# Patient Record
Sex: MALE | Race: WHITE | NOT HISPANIC OR LATINO | Employment: FULL TIME | URBAN - METROPOLITAN AREA
[De-identification: names, ages, dates, MRNs, and addresses within clinical notes are randomized per-mention and may not be internally consistent; named-entity substitution may affect disease eponyms.]

---

## 2018-01-10 NOTE — MISCELLANEOUS
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

## 2018-02-12 ENCOUNTER — OFFICE VISIT (OUTPATIENT)
Dept: FAMILY MEDICINE CLINIC | Facility: CLINIC | Age: 23
End: 2018-02-12
Payer: COMMERCIAL

## 2018-02-12 VITALS
WEIGHT: 145 LBS | BODY MASS INDEX: 20.3 KG/M2 | HEART RATE: 100 BPM | SYSTOLIC BLOOD PRESSURE: 120 MMHG | HEIGHT: 71 IN | TEMPERATURE: 103.2 F | DIASTOLIC BLOOD PRESSURE: 62 MMHG

## 2018-02-12 DIAGNOSIS — J01.90 ACUTE NON-RECURRENT SINUSITIS, UNSPECIFIED LOCATION: Primary | ICD-10-CM

## 2018-02-12 PROCEDURE — 99213 OFFICE O/P EST LOW 20 MIN: CPT | Performed by: FAMILY MEDICINE

## 2018-02-12 PROCEDURE — 3008F BODY MASS INDEX DOCD: CPT | Performed by: FAMILY MEDICINE

## 2018-02-12 RX ORDER — AZITHROMYCIN 500 MG/1
500 TABLET, FILM COATED ORAL DAILY
Qty: 3 TABLET | Refills: 0 | Status: SHIPPED | OUTPATIENT
Start: 2018-02-12 | End: 2018-02-15

## 2018-02-12 NOTE — ASSESSMENT & PLAN NOTE
Patient was placed on Zithromax 500 mg 1 daily #3 , Mucinex D 1 twice a day and Delsym 2 tsp twice a day for his cough  He will get a note to stay out of work for the next 2 days

## 2018-02-12 NOTE — PROGRESS NOTES
Assessment/Plan:    Acute non-recurrent sinusitis  Patient was placed on Zithromax 500 mg 1 daily #3 , Mucinex D 1 twice a day and Delsym 2 tsp twice a day for his cough  He will get a note to stay out of work for the next 2 days  Diagnoses and all orders for this visit:    Acute non-recurrent sinusitis, unspecified location  -     azithromycin (ZITHROMAX) 500 MG tablet; Take 1 tablet (500 mg total) by mouth daily for 3 days          Subjective:      Patient ID: Eduardo Abreu is a 25 y o  male  CC:  Congestion, fever, body aches  Started 2 days ago  Dry cough  HA    Sinus pressure  - ls    HPI:  This is a 67-year-old male who comes in with head congestion, fever for the past 2 days  Also has a headache because of the congestion  He feels achy and has a dry cough  The cough does not keep him awake at night  He does have sinus pressure and felt like several days ago that he might have had the flu  Those symptoms seem to have gone except for his temperature of a 103 2° and last night was 101  Has been using NyQuil and some ibuprofen with no affect  His blood pressure is 120/62  The following portions of the patient's history were reviewed and updated as appropriate: allergies, current medications, past family history, past medical history, past social history, past surgical history and problem list     Review of Systems   Constitutional: Positive for fatigue and fever  Negative for appetite change, chills and diaphoresis  HENT: Positive for congestion, ear pain, postnasal drip, rhinorrhea and sinus pressure  Eyes: Negative  Respiratory: Positive for cough  Negative for shortness of breath and wheezing  Cardiovascular: Negative  Gastrointestinal: Negative  Endocrine: Negative  Genitourinary: Negative  Musculoskeletal: Positive for myalgias  Negative for neck pain  Skin: Negative  Allergic/Immunologic: Negative  Neurological: Negative      Hematological: Negative  Psychiatric/Behavioral: Negative  Vitals:    02/12/18 1432   BP: 120/62   BP Location: Left arm   Patient Position: Sitting   Cuff Size: Standard   Pulse: 100   Temp: (!) 103 2 °F (39 6 °C)   TempSrc: Oral   Weight: 65 8 kg (145 lb)   Height: 5' 11" (1 803 m)   Objective:    Vitals:    02/12/18 1432   BP: 120/62   Pulse: 100   Temp: (!) 103 2 °F (39 6 °C)        Physical Exam   Constitutional: He is oriented to person, place, and time  He appears well-developed and well-nourished  HENT:   Head: Normocephalic  Right Ear: External ear normal    Left Ear: External ear normal    Mouth/Throat: Oropharynx is clear and moist    Some cerumen seen in both ear canals  Eyes: Conjunctivae and EOM are normal  Pupils are equal, round, and reactive to light  Neck: Normal range of motion  Neck supple  Cardiovascular: Normal rate, regular rhythm and normal heart sounds  Pulmonary/Chest: Effort normal and breath sounds normal  He has no wheezes  He has no rales  Abdominal: Soft  Bowel sounds are normal    Musculoskeletal: Normal range of motion  Lymphadenopathy:     He has no cervical adenopathy  Neurological: He is alert and oriented to person, place, and time  Skin: Skin is warm  Psychiatric: He has a normal mood and affect  His behavior is normal  Judgment and thought content normal    Nursing note and vitals reviewed

## 2019-03-07 ENCOUNTER — OFFICE VISIT (OUTPATIENT)
Dept: FAMILY MEDICINE CLINIC | Facility: CLINIC | Age: 24
End: 2019-03-07
Payer: COMMERCIAL

## 2019-03-07 VITALS
TEMPERATURE: 98 F | DIASTOLIC BLOOD PRESSURE: 60 MMHG | WEIGHT: 142.8 LBS | HEART RATE: 72 BPM | SYSTOLIC BLOOD PRESSURE: 100 MMHG | BODY MASS INDEX: 19.34 KG/M2 | HEIGHT: 72 IN

## 2019-03-07 DIAGNOSIS — H69.80 DYSFUNCTION OF EUSTACHIAN TUBE, UNSPECIFIED LATERALITY: ICD-10-CM

## 2019-03-07 DIAGNOSIS — J30.9 ALLERGIC RHINITIS, UNSPECIFIED SEASONALITY, UNSPECIFIED TRIGGER: ICD-10-CM

## 2019-03-07 DIAGNOSIS — Z72.0 TOBACCO ABUSE: ICD-10-CM

## 2019-03-07 DIAGNOSIS — H66.001 ACUTE SUPPURATIVE OTITIS MEDIA OF RIGHT EAR WITHOUT SPONTANEOUS RUPTURE OF TYMPANIC MEMBRANE, RECURRENCE NOT SPECIFIED: ICD-10-CM

## 2019-03-07 DIAGNOSIS — J01.90 ACUTE NON-RECURRENT SINUSITIS, UNSPECIFIED LOCATION: Primary | ICD-10-CM

## 2019-03-07 DIAGNOSIS — Z23 ENCOUNTER FOR IMMUNIZATION: ICD-10-CM

## 2019-03-07 DIAGNOSIS — M08.3 POLYARTICULAR JUVENILE RHEUMATOID ARTHRITIS, CHRONIC (HCC): ICD-10-CM

## 2019-03-07 PROBLEM — H69.90 EUSTACHIAN TUBE DYSFUNCTION: Status: ACTIVE | Noted: 2019-03-07

## 2019-03-07 PROCEDURE — 99214 OFFICE O/P EST MOD 30 MIN: CPT | Performed by: FAMILY MEDICINE

## 2019-03-07 PROCEDURE — 3008F BODY MASS INDEX DOCD: CPT | Performed by: FAMILY MEDICINE

## 2019-03-07 RX ORDER — AZITHROMYCIN 250 MG/1
TABLET, FILM COATED ORAL
Qty: 6 TABLET | Refills: 0 | Status: SHIPPED | OUTPATIENT
Start: 2019-03-07 | End: 2019-03-12

## 2019-03-07 RX ORDER — AZELASTINE 1 MG/ML
2 SPRAY, METERED NASAL 2 TIMES DAILY
Qty: 30 ML | Refills: 3 | Status: SHIPPED | OUTPATIENT
Start: 2019-03-07 | End: 2020-03-03 | Stop reason: ALTCHOICE

## 2019-03-07 NOTE — PROGRESS NOTES
Assessment and Plan:  1  Sinusitis, Z-Victor Hugo ordered  2  Otitis media, Z-Victor Hugo ordered  3  Per allergic rhinitis, Astelin ordered  4  Eustachian tube dysfunction, Astelin ordered  5  Tobacco abuse, complete cessation recommended  6  JRA, refer to Rheumatology  7  Patient to return in 1 week if still symptoms             Problem List Items Addressed This Visit        Respiratory    Acute non-recurrent sinusitis - Primary    Relevant Medications    azithromycin (ZITHROMAX) 250 mg tablet    Allergic rhinitis     Astelin ordered         Relevant Medications    azelastine (ASTELIN) 0 1 % nasal spray       Nervous and Auditory    Eustachian tube dysfunction     Astelin ordered         Relevant Medications    azelastine (ASTELIN) 0 1 % nasal spray       Musculoskeletal and Integument    Polyarticular juvenile rheumatoid arthritis, chronic (HCC)     Explained importance of rheumatologic evaluation patient is in agreement, refer to Rheumatology         Relevant Orders    Ambulatory referral to Rheumatology       Other    Tobacco abuse     Complete cessation recommended           Other Visit Diagnoses     Encounter for immunization        Relevant Orders    Pneumococcal polysaccharide vaccine 23-valent greater than or equal to 3yo subcutaneous/IM    Tdap vaccine greater than or equal to 8yo IM    PREFERRED: influenza vaccine, [id number], quadrivalent, recombinant, PF, 0 5 mL (FLUBLOK)    Acute suppurative otitis media of right ear without spontaneous rupture of tympanic membrane, recurrence not specified        Relevant Medications    azithromycin (ZITHROMAX) 250 mg tablet             Diagnoses and all orders for this visit:    Acute non-recurrent sinusitis, unspecified location  -     azithromycin (ZITHROMAX) 250 mg tablet;  Take 2 tablets today then 1 tablet daily till finished    Encounter for immunization  -     Pneumococcal polysaccharide vaccine 23-valent greater than or equal to 3yo subcutaneous/IM  -     Tdap vaccine greater than or equal to 6yo IM  -     PREFERRED: influenza vaccine, 3632-2509, quadrivalent, recombinant, PF, 0 5 mL (FLUBLOK)    Allergic rhinitis, unspecified seasonality, unspecified trigger  -     azelastine (ASTELIN) 0 1 % nasal spray; 2 sprays into each nostril 2 (two) times a day Use in each nostril as directed    Dysfunction of Eustachian tube, unspecified laterality  -     azelastine (ASTELIN) 0 1 % nasal spray; 2 sprays into each nostril 2 (two) times a day Use in each nostril as directed    Acute suppurative otitis media of right ear without spontaneous rupture of tympanic membrane, recurrence not specified  -     azithromycin (ZITHROMAX) 250 mg tablet; Take 2 tablets today then 1 tablet daily till finished    Tobacco abuse    Polyarticular juvenile rheumatoid arthritis, chronic ([facility] [address] )  -     Ambulatory referral to Rheumatology; Future              Subjective:      Patient ID: Susan Pillai is a 26 y o  male  CC:    Chief Complaint   Patient presents with    Cold Like Symptoms     Onset 4 days ago with fever, stiff neck, generalized body aches, head congestion and PND  Tried Dayquil and Nyquil  HPI:    Chief complaint is URI    For the past 4-5 days patient increasing sinus pain and pressure sneezing PRA a postnasal drip scratchy throat  Mild otalgia negative otorrhea  Patient is using Tylenol and Advil as needed  No other medications  Patient did have fever at 2-3 days ago not at the present time  No chest pain shortness breath wheeze cough or hemoptysis  In addition patient is not seeing rheumatologist for his JRA  Will refer him to Rheumatology explained the need to preserve his joints  Patient is in agreement  Unfortunate, patient does continue to smoke        The following portions of the patient's history were reviewed and updated as appropriate: allergies, current medications, past family history, past medical history, past social history, past surgical history and problem list       Review of Systems   Constitutional:        HPI   HENT:        HPI   Eyes: Negative  Respiratory:        HPI   Cardiovascular: Negative for chest pain  Gastrointestinal: Negative  Endocrine: Negative  Genitourinary: Negative  Musculoskeletal:        HPI   Skin: Negative  Allergic/Immunologic: Positive for environmental allergies  Neurological: Negative  Hematological: Negative  Psychiatric/Behavioral: Negative  Data to review:       Objective:    Vitals:    03/07/19 1032   BP: 100/60   BP Location: Left arm   Patient Position: Sitting   Cuff Size: Standard   Pulse: 72   Temp: 98 °F (36 7 °C)   TempSrc: Tympanic   Weight: 64 8 kg (142 lb 12 8 oz)   Height: 5' 11 75" (1 822 m)        Physical Exam   Constitutional: He is oriented to person, place, and time  He appears well-developed and well-nourished  HENT:   Head: Normocephalic and atraumatic  Mouth/Throat: No oropharyngeal exudate  Both tympanic membranes are dull right TM with mild injection positive allergic turbinates positive pansinus tenderness to percussion positive purulent postnasal drip minimal pharyngeal injection negative exudate   Eyes: Pupils are equal, round, and reactive to light  Conjunctivae and EOM are normal  No scleral icterus  Neck: Neck supple  Cardiovascular: Normal rate, regular rhythm and normal heart sounds  Pulmonary/Chest: Effort normal and breath sounds normal    Abdominal: Soft  Bowel sounds are normal  There is no tenderness  Musculoskeletal:    negative arthritic changes in hands   Lymphadenopathy:     He has cervical adenopathy  Neurological: He is alert and oriented to person, place, and time  No cranial nerve deficit  Skin: Skin is warm and dry  Psychiatric: He has a normal mood and affect

## 2019-05-23 ENCOUNTER — OFFICE VISIT (OUTPATIENT)
Dept: FAMILY MEDICINE CLINIC | Facility: CLINIC | Age: 24
End: 2019-05-23
Payer: COMMERCIAL

## 2019-05-23 VITALS
DIASTOLIC BLOOD PRESSURE: 60 MMHG | HEIGHT: 72 IN | WEIGHT: 149 LBS | HEART RATE: 76 BPM | SYSTOLIC BLOOD PRESSURE: 120 MMHG | BODY MASS INDEX: 20.18 KG/M2 | TEMPERATURE: 98.9 F

## 2019-05-23 DIAGNOSIS — Z00.00 HEALTHCARE MAINTENANCE: ICD-10-CM

## 2019-05-23 DIAGNOSIS — E04.0 DIFFUSE NONTOXIC GOITER: ICD-10-CM

## 2019-05-23 DIAGNOSIS — E55.9 VITAMIN D DEFICIENCY: ICD-10-CM

## 2019-05-23 DIAGNOSIS — H61.22 IMPACTED CERUMEN OF LEFT EAR: ICD-10-CM

## 2019-05-23 DIAGNOSIS — Z72.0 TOBACCO ABUSE: ICD-10-CM

## 2019-05-23 DIAGNOSIS — M08.3 POLYARTICULAR JUVENILE RHEUMATOID ARTHRITIS, CHRONIC (HCC): ICD-10-CM

## 2019-05-23 DIAGNOSIS — H65.111 NON-RECURRENT ACUTE ALLERGIC OTITIS MEDIA OF RIGHT EAR: Primary | ICD-10-CM

## 2019-05-23 PROBLEM — J01.90 ACUTE NON-RECURRENT SINUSITIS: Status: RESOLVED | Noted: 2018-02-12 | Resolved: 2019-05-23

## 2019-05-23 PROCEDURE — 99213 OFFICE O/P EST LOW 20 MIN: CPT | Performed by: FAMILY MEDICINE

## 2019-05-23 PROCEDURE — 3008F BODY MASS INDEX DOCD: CPT | Performed by: FAMILY MEDICINE

## 2019-05-23 PROCEDURE — 1036F TOBACCO NON-USER: CPT | Performed by: FAMILY MEDICINE

## 2019-05-23 RX ORDER — AMOXICILLIN 875 MG/1
875 TABLET, COATED ORAL 2 TIMES DAILY
Qty: 20 TABLET | Refills: 0 | Status: SHIPPED | OUTPATIENT
Start: 2019-05-23 | End: 2019-06-02

## 2019-08-09 ENCOUNTER — CLINICAL SUPPORT (OUTPATIENT)
Dept: FAMILY MEDICINE CLINIC | Facility: CLINIC | Age: 24
End: 2019-08-09
Payer: COMMERCIAL

## 2019-08-09 DIAGNOSIS — Z23 ENCOUNTER FOR VACCINATION: Primary | ICD-10-CM

## 2019-08-09 PROCEDURE — 90715 TDAP VACCINE 7 YRS/> IM: CPT | Performed by: FAMILY MEDICINE

## 2019-08-09 PROCEDURE — 90471 IMMUNIZATION ADMIN: CPT | Performed by: FAMILY MEDICINE

## 2019-11-29 ENCOUNTER — HOSPITAL ENCOUNTER (EMERGENCY)
Facility: HOSPITAL | Age: 24
Discharge: HOME | End: 2019-11-29
Attending: EMERGENCY MEDICINE | Admitting: EMERGENCY MEDICINE
Payer: COMMERCIAL

## 2019-11-29 VITALS
TEMPERATURE: 97.9 F | HEART RATE: 72 BPM | SYSTOLIC BLOOD PRESSURE: 128 MMHG | RESPIRATION RATE: 16 BRPM | DIASTOLIC BLOOD PRESSURE: 60 MMHG | OXYGEN SATURATION: 99 %

## 2019-11-29 DIAGNOSIS — R55 VASOVAGAL NEAR SYNCOPE: ICD-10-CM

## 2019-11-29 DIAGNOSIS — R10.9 ABDOMINAL PAIN: Primary | ICD-10-CM

## 2019-11-29 LAB
ALBUMIN SERPL BCP-MCNC: 3.8 G/DL (ref 3.5–5)
ALP SERPL-CCNC: 63 U/L (ref 46–116)
ALT SERPL W P-5'-P-CCNC: 16 U/L (ref 12–78)
ANION GAP SERPL CALCULATED.3IONS-SCNC: 5 MMOL/L (ref 4–13)
AST SERPL W P-5'-P-CCNC: 15 U/L (ref 5–45)
BASOPHILS # BLD AUTO: 0.03 THOUSANDS/ΜL (ref 0–0.1)
BASOPHILS NFR BLD AUTO: 1 % (ref 0–1)
BILIRUB SERPL-MCNC: 0.46 MG/DL (ref 0.2–1)
BILIRUB UR QL STRIP: NEGATIVE
BUN SERPL-MCNC: 14 MG/DL (ref 5–25)
CALCIUM SERPL-MCNC: 9.2 MG/DL (ref 8.3–10.1)
CHLORIDE SERPL-SCNC: 102 MMOL/L (ref 100–108)
CLARITY UR: CLEAR
CO2 SERPL-SCNC: 32 MMOL/L (ref 21–32)
COLOR UR: YELLOW
COLOR, POC: NORMAL
CREAT SERPL-MCNC: 1.28 MG/DL (ref 0.6–1.3)
EOSINOPHIL # BLD AUTO: 0.3 THOUSAND/ΜL (ref 0–0.61)
EOSINOPHIL NFR BLD AUTO: 5 % (ref 0–6)
ERYTHROCYTE [DISTWIDTH] IN BLOOD BY AUTOMATED COUNT: 13 % (ref 11.6–15.1)
GFR SERPL CREATININE-BSD FRML MDRD: 78 ML/MIN/1.73SQ M
GLUCOSE SERPL-MCNC: 214 MG/DL (ref 65–140)
GLUCOSE UR STRIP-MCNC: NEGATIVE MG/DL
HCT VFR BLD AUTO: 45.4 % (ref 36.5–49.3)
HGB BLD-MCNC: 14.5 G/DL (ref 12–17)
HGB UR QL STRIP.AUTO: NEGATIVE
IMM GRANULOCYTES # BLD AUTO: 0.01 THOUSAND/UL (ref 0–0.2)
IMM GRANULOCYTES NFR BLD AUTO: 0 % (ref 0–2)
KETONES UR STRIP-MCNC: NEGATIVE MG/DL
LEUKOCYTE ESTERASE UR QL STRIP: NEGATIVE
LIPASE SERPL-CCNC: 114 U/L (ref 73–393)
LYMPHOCYTES # BLD AUTO: 1.36 THOUSANDS/ΜL (ref 0.6–4.47)
LYMPHOCYTES NFR BLD AUTO: 23 % (ref 14–44)
MCH RBC QN AUTO: 28.1 PG (ref 26.8–34.3)
MCHC RBC AUTO-ENTMCNC: 31.9 G/DL (ref 31.4–37.4)
MCV RBC AUTO: 88 FL (ref 82–98)
MONOCYTES # BLD AUTO: 0.48 THOUSAND/ΜL (ref 0.17–1.22)
MONOCYTES NFR BLD AUTO: 8 % (ref 4–12)
NEUTROPHILS # BLD AUTO: 3.81 THOUSANDS/ΜL (ref 1.85–7.62)
NEUTS SEG NFR BLD AUTO: 63 % (ref 43–75)
NITRITE UR QL STRIP: NEGATIVE
NRBC BLD AUTO-RTO: 0 /100 WBCS
PH UR STRIP.AUTO: 6 [PH] (ref 4.5–8)
PLATELET # BLD AUTO: 159 THOUSANDS/UL (ref 149–390)
PMV BLD AUTO: 10.3 FL (ref 8.9–12.7)
POTASSIUM SERPL-SCNC: 3.5 MMOL/L (ref 3.5–5.3)
PROT SERPL-MCNC: 7 G/DL (ref 6.4–8.2)
PROT UR STRIP-MCNC: NEGATIVE MG/DL
RBC # BLD AUTO: 5.16 MILLION/UL (ref 3.88–5.62)
SODIUM SERPL-SCNC: 139 MMOL/L (ref 136–145)
SP GR UR STRIP.AUTO: >=1.03 (ref 1–1.03)
UROBILINOGEN UR QL STRIP.AUTO: 0.2 E.U./DL
WBC # BLD AUTO: 5.99 THOUSAND/UL (ref 4.31–10.16)

## 2019-11-29 PROCEDURE — 36415 COLL VENOUS BLD VENIPUNCTURE: CPT | Performed by: EMERGENCY MEDICINE

## 2019-11-29 PROCEDURE — 99283 EMERGENCY DEPT VISIT LOW MDM: CPT | Performed by: EMERGENCY MEDICINE

## 2019-11-29 PROCEDURE — 81003 URINALYSIS AUTO W/O SCOPE: CPT

## 2019-11-29 PROCEDURE — 93005 ELECTROCARDIOGRAM TRACING: CPT

## 2019-11-29 PROCEDURE — 85025 COMPLETE CBC W/AUTO DIFF WBC: CPT | Performed by: EMERGENCY MEDICINE

## 2019-11-29 PROCEDURE — 99284 EMERGENCY DEPT VISIT MOD MDM: CPT

## 2019-11-29 PROCEDURE — 83690 ASSAY OF LIPASE: CPT | Performed by: EMERGENCY MEDICINE

## 2019-11-29 PROCEDURE — 80053 COMPREHEN METABOLIC PANEL: CPT | Performed by: EMERGENCY MEDICINE

## 2019-11-30 LAB
ATRIAL RATE: 65 BPM
P AXIS: 56 DEGREES
PR INTERVAL: 138 MS
QRS AXIS: 95 DEGREES
QRSD INTERVAL: 102 MS
QT INTERVAL: 396 MS
QTC INTERVAL: 411 MS
T WAVE AXIS: 80 DEGREES
VENTRICULAR RATE: 65 BPM

## 2019-11-30 PROCEDURE — 93010 ELECTROCARDIOGRAM REPORT: CPT | Performed by: INTERNAL MEDICINE

## 2019-11-30 NOTE — ED ATTENDING ATTESTATION
11/29/2019  I, Daniel Toney MD, saw and evaluated the patient  I have discussed the patient with the resident/non-physician practitioner and agree with the resident's/non-physician practitioner's findings, Plan of Care, and MDM as documented in the resident's/non-physician practitioner's note, except where noted  All available labs and Radiology studies were reviewed  I was present for key portions of any procedure(s) performed by the resident/non-physician practitioner and I was immediately available to provide assistance  At this point I agree with the current assessment done in the Emergency Department  I have conducted an independent evaluation of this patient a history and physical is as follows:    23 YO male presents with LUQ pain while eating  States this was sudden onset and made him heel lightheaded  States the pain has been sharp, constant, it is improving since onset  States lightheadedness has resolved  Pt denies CP/SOB/F/C/N/V/D/C, no dysuria, burning on urination or blood in urine  Gen: Pt is in NAD  HEENT: Head is atraumatic, EOM's intact, neck has FROM  Chest: CTAB, non-tender  Heart: RRR  Abdomen: Soft, Mildly tender in the LUQ, no rebound or guarding  Musculoskeletal: FROM in all extremities  Skin: No rash, no ecchymosis  Neuro: Awake, alert, oriented x4; Cranial nerves II-XII intact  Psych: Normal affect    MDM - Sudden onset pain while eating, likely with a vasovagal episode  Will check CBC,  LFT's to assess GB dysfunction, lipase for pancreatitis, ECG         ED Course         Critical Care Time  Procedures

## 2019-11-30 NOTE — ED PROVIDER NOTES
History  Chief Complaint   Patient presents with    Abdominal Pain     Pt  c/o LUQ pain and also reports " about 15 mins ago I felt like I was gong to pass out"  Patient is a 27-year-old male, otherwise healthy presents for left upper quadrant abdominal pain and near syncope  Patient says he was eating dinner just before arrival when he had the sudden onset of left upper abdominal discomfort  He says that he felt associated lightheadedness, graying of his vision and some diaphoresis  Patient says that he never actually did pass out  Patient says that the symptoms have improved but he still experience some minor left upper quadrant discomfort  He denied chest pain, shortness of breath, headache  Prior to Admission Medications   Prescriptions Last Dose Informant Patient Reported? Taking?   azelastine (ASTELIN) 0 1 % nasal spray  Self No No   Si sprays into each nostril 2 (two) times a day Use in each nostril as directed      Facility-Administered Medications: None       Past Medical History:   Diagnosis Date    Allergic rhinitis     [id number] RESOLVED    Blepharitis     [id number]  UNSPECIFIED LATERALITY   [id number] RESOLVED    Rheumatoid arthritis ([facility] [address] )     Viral gastroenteritis     [id number] RESOLVED       Past Surgical History:   Procedure Laterality Date    HERNIA REPAIR Bilateral     Infant       Family History   Problem Relation Age of Onset    Anxiety disorder Mother     Seizures Mother     Meniere's disease Father     Seizures Brother     Asperger's syndrome Brother      I have reviewed and agree with the history as documented      Social History     Tobacco Use    Smoking status: Never Smoker    Smokeless tobacco: Never Used    Tobacco comment: [employer] SA[employer] NEVER A SMOKER   Substance Use Topics    Alcohol use: Yes     Frequency: 2-4 times a month     Drinks per session: 1 or 2     Binge frequency: Never     Comment: very rarely  [employer] SAYS NO ALCOHOL USE    Drug use: Yes     Frequency: 2 0 times per week     Types: Marijuana        Review of Systems   Constitutional: Negative for chills, fever and unexpected weight change  HENT: Negative for congestion, sore throat and trouble swallowing  Eyes: Negative for pain, discharge and itching  Respiratory: Negative for cough, chest tightness, shortness of breath and wheezing  Cardiovascular: Negative for chest pain, palpitations and leg swelling  Gastrointestinal: Positive for abdominal pain  Negative for blood in stool, diarrhea, nausea and vomiting  Endocrine: Negative for polyuria  Genitourinary: Negative for difficulty urinating, dysuria, frequency and hematuria  Musculoskeletal: Negative for arthralgias and back pain  Neurological: Negative for dizziness, syncope, weakness, light-headedness and headaches  Near syncope         Physical Exam  ED Triage Vitals [11/29/19 2002]   Temperature Pulse Respirations Blood Pressure SpO2   97 9 °F (36 6 °C) 81 16 130/69 99 %      Temp Source Heart Rate Source Patient Position - Orthostatic VS BP Location FiO2 (%)   Temporal Monitor Sitting Right arm --      Pain Score       6             Orthostatic Vital Signs  Vitals:    11/29/19 2002 11/29/19 2213   BP: 130/69 128/60   Pulse: 81 72   Patient Position - Orthostatic VS: Sitting Sitting       Physical Exam   Constitutional: He is oriented to person, place, and time  He appears well-developed and well-nourished  No distress  HENT:   Head: Normocephalic and atraumatic  Right Ear: External ear normal    Left Ear: External ear normal    Eyes: Pupils are equal, round, and reactive to light  Conjunctivae and EOM are normal    Neck: Normal range of motion  Cardiovascular: Normal rate, regular rhythm, normal heart sounds and intact distal pulses  Exam reveals no gallop and no friction rub  No murmur heard  Pulmonary/Chest: Effort normal and breath sounds normal  No respiratory distress  He has no wheezes   He has no rales  Abdominal: Soft  Bowel sounds are normal  He exhibits no distension  There is tenderness (mild) in the left upper quadrant  There is no guarding  Musculoskeletal: Normal range of motion  He exhibits no edema, tenderness or deformity  Lymphadenopathy:     He has no cervical adenopathy  Neurological: He is alert and oriented to person, place, and time  No cranial nerve deficit or sensory deficit  He exhibits normal muscle tone  Skin: Skin is warm and dry  Psychiatric: He has a normal mood and affect  His behavior is normal    Nursing note and vitals reviewed        ED Medications  Medications - No data to display    Diagnostic Studies  Results Reviewed     Procedure Component Value Units Date/Time    POCT urinalysis dipstick [[id number]]  (Normal) Resulted:  11/29/19 2245    Lab Status:  Final result Specimen:  Urine Updated:  11/29/19 2245     Color, UA -    Urine Macroscopic, POC [[id number]] Collected:  11/29/19 2238    Lab Status:  Final result Specimen:  Urine Updated:  11/29/19 2240     Color, UA Yellow     Clarity, UA Clear     pH, UA 6 0     Leukocytes, UA Negative     Nitrite, UA Negative     Protein, UA Negative mg/dl      Glucose, UA Negative mg/dl      Ketones, UA Negative mg/dl      Urobilinogen, UA 0 2 E U /dl      Bilirubin, UA Negative     Blood, UA Negative     Specific Gravity, UA >=1 030    Narrative:       CLINITEK RESULT    Comprehensive metabolic panel [[id number]]  (Abnormal) Collected:  11/29/19 2059    Lab Status:  Final result Specimen:  Blood from Arm, Right Updated:  11/29/19 2126     Sodium 139 mmol/L      Potassium 3 5 mmol/L      Chloride 102 mmol/L      CO2 32 mmol/L      ANION GAP 5 mmol/L      BUN 14 mg/dL      Creatinine 1 28 mg/dL      Glucose 214 mg/dL      Calcium 9 2 mg/dL      AST 15 U/L      ALT 16 U/L      Alkaline Phosphatase 63 U/L      Total Protein 7 0 g/dL      Albumin 3 8 g/dL      Total Bilirubin 0 46 mg/dL      eGFR 78 ml/min/1 73sq m     Narrative: National Kidney Disease Foundation guidelines for Chronic Kidney Disease (CKD):     Stage 1 with normal or high GFR (GFR > 90 mL/min/1 73 square meters)    Stage 2 Mild CKD (GFR = 60-89 mL/min/1 73 square meters)    Stage 3A Moderate CKD (GFR = 45-59 mL/min/1 73 square meters)    Stage 3B Moderate CKD (GFR = 30-44 mL/min/1 73 square meters)    Stage 4 Severe CKD (GFR = 15-29 mL/min/1 73 square meters)    Stage 5 End Stage CKD (GFR <15 mL/min/1 73 square meters)  Note: GFR calculation is accurate only with a steady state creatinine    Lipase [[id number]]  (Normal) Collected:  11/29/19 2059    Lab Status:  Final result Specimen:  Blood from Arm, Right Updated:  11/29/19 2126     Lipase 114 u/L     CBC and differential [[id number]] Collected:  11/29/19 2059    Lab Status:  Final result Specimen:  Blood from Arm, Right Updated:  11/29/19 2106     WBC 5 99 Thousand/uL      RBC 5 16 Million/uL      Hemoglobin 14 5 g/dL      Hematocrit 45 4 %      MCV 88 fL      MCH 28 1 pg      MCHC 31 9 g/dL      RDW 13 0 %      MPV 10 3 fL      Platelets 223 Thousands/uL      nRBC 0 /100 WBCs      Neutrophils Relative 63 %      Immat GRANS % 0 %      Lymphocytes Relative 23 %      Monocytes Relative 8 %      Eosinophils Relative 5 %      Basophils Relative 1 %      Neutrophils Absolute 3 81 Thousands/µL      Immature Grans Absolute 0 01 Thousand/uL      Lymphocytes Absolute 1 36 Thousands/µL      Monocytes Absolute 0 48 Thousand/µL      Eosinophils Absolute 0 30 Thousand/µL      Basophils Absolute 0 03 Thousands/µL                  No orders to display         Procedures  Procedures        ED Course                               MDM  Number of Diagnoses or Management Options  Abdominal pain:   Vasovagal near syncope:   Diagnosis management comments: 50-year-old male presenting for near syncopal episode after acute onset of left upper quadrant abdominal pain  Had graying of his vision and some diaphoresis    No chest pain or shortness of breath  Abdominal pain has improved  Symptoms sound like vasovagal near-syncope given abdominal pain  Will obtain belly labs  Patient declined treatment for his pain  EKG normal sinus rhythm with no ischemic changes  Labs within normal limits except for a glucose of 214  UA shows no glucose  Told patient that he needs to follow up with his family doctor in regards to elevated glucose which he was understanding of      Disposition  Final diagnoses:   Abdominal pain   Vasovagal near syncope     Time reflects when diagnosis was documented in both MDM as applicable and the Disposition within this note     Time User Action Codes Description Comment    11/29/2019  9:38 PM Janet Precise Add [R10 9] Abdominal pain     11/29/2019  9:38 PM Janet Precise Add [R55] Vasovagal near syncope       ED Disposition     ED Disposition Condition Date/Time Comment    Discharge Stable Fri Nov 29, 2019  9:38 PM Lolita Montenegro discharge to home/self care  Follow-up Information     Follow up With Specialties Details Why Contact Info    Amrik Rolon PA-C Family Medicine, Physician Assistant Schedule an appointment as soon as possible for a visit  For follow up of symptoms [address] [place] [address] [address]  [phone]            Discharge Medication List as of 11/29/2019 10:48 PM      CONTINUE these medications which have NOT CHANGED    Details   azelastine (ASTELIN) 0 1 % nasal spray 2 sprays into each nostril 2 (two) times a day Use in each nostril as directed, Starting Thu 3/7/2019, Normal           No discharge procedures on file  ED Provider  Attending physically available and evaluated Nadege Fan I managed the patient along with the ED Attending      Electronically Signed by         Tamra Johnson DO  11/29/19 8925

## 2019-11-30 NOTE — DISCHARGE INSTRUCTIONS
It is important that you follow up with your primary care doctor for the elevated blood sugar that was found on your labs

## 2019-12-12 ENCOUNTER — TRANSCRIBE ORDERS (OUTPATIENT)
Dept: LAB | Facility: CLINIC | Age: 24
End: 2019-12-12

## 2019-12-12 ENCOUNTER — OFFICE VISIT (OUTPATIENT)
Dept: RHEUMATOLOGY | Facility: CLINIC | Age: 24
End: 2019-12-12
Payer: COMMERCIAL

## 2019-12-12 ENCOUNTER — APPOINTMENT (OUTPATIENT)
Dept: LAB | Facility: CLINIC | Age: 24
End: 2019-12-12
Payer: COMMERCIAL

## 2019-12-12 VITALS
HEIGHT: 72 IN | WEIGHT: 142 LBS | BODY MASS INDEX: 19.23 KG/M2 | DIASTOLIC BLOOD PRESSURE: 64 MMHG | SYSTOLIC BLOOD PRESSURE: 123 MMHG | HEART RATE: 70 BPM

## 2019-12-12 DIAGNOSIS — M25.50 POLYARTHRALGIA: ICD-10-CM

## 2019-12-12 DIAGNOSIS — M54.50 CHRONIC BILATERAL LOW BACK PAIN WITHOUT SCIATICA: Primary | ICD-10-CM

## 2019-12-12 DIAGNOSIS — M08.3 POLYARTICULAR JUVENILE RHEUMATOID ARTHRITIS, CHRONIC (HCC): ICD-10-CM

## 2019-12-12 DIAGNOSIS — G89.29 CHRONIC BILATERAL LOW BACK PAIN WITHOUT SCIATICA: Primary | ICD-10-CM

## 2019-12-12 LAB
CRP SERPL QL: <3 MG/L
ERYTHROCYTE [SEDIMENTATION RATE] IN BLOOD: 2 MM/HOUR (ref 0–10)

## 2019-12-12 PROCEDURE — 86200 CCP ANTIBODY: CPT | Performed by: INTERNAL MEDICINE

## 2019-12-12 PROCEDURE — 86140 C-REACTIVE PROTEIN: CPT | Performed by: INTERNAL MEDICINE

## 2019-12-12 PROCEDURE — 86430 RHEUMATOID FACTOR TEST QUAL: CPT | Performed by: INTERNAL MEDICINE

## 2019-12-12 PROCEDURE — 85652 RBC SED RATE AUTOMATED: CPT | Performed by: INTERNAL MEDICINE

## 2019-12-12 PROCEDURE — 99244 OFF/OP CNSLTJ NEW/EST MOD 40: CPT | Performed by: INTERNAL MEDICINE

## 2019-12-12 PROCEDURE — 81374 HLA I TYPING 1 ANTIGEN LR: CPT | Performed by: INTERNAL MEDICINE

## 2019-12-12 PROCEDURE — 36415 COLL VENOUS BLD VENIPUNCTURE: CPT | Performed by: INTERNAL MEDICINE

## 2019-12-12 NOTE — PROGRESS NOTES
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 who presents for rheumatologic evaluation  We do not have his prior pediatric rheumatology records for review  He describes being diagnosed with this around age 13 when he was having joint pain in his hands, knees and feet  He also describes a diagnosis of some form of colitis at that time as well, but has not been treatment for either of these conditions over the past several years  Today in the office he really has no complaints but wanted to make sure he should not be on any form of treatment  His exam looks unremarkable as there are no signs of inflammatory arthritis today and no joint deformities suggesting prior inflammatory arthritis  I discussed with him that I would not suggest any treatment at this time given lack of any findings of active disease  We will do some blood test to look for markers of autoimmune inflammatory arthritis, but for now would just continue to monitor him off treatment  He was agreeable with that plan and we will otherwise follow-up in 1 year  Plan:  Diagnoses and all orders for this visit:    Chronic bilateral low back pain without sciatica  -     HLA-B27 antigen  -     RF Screen w/ Reflex to Titer  -     Cyclic citrul peptide antibody, IgG  -     Sedimentation rate, automated  -     C-reactive protein    Polyarthralgia  -     HLA-B27 antigen  -     RF Screen w/ Reflex to Titer  -     Cyclic citrul peptide antibody, IgG  -     Sedimentation rate, automated  -     C-reactive protein    Polyarticular juvenile rheumatoid arthritis, chronic (HCC)        Follow-up plan: 1 year      HPI  Lisette Frost is a 22 y o   male with history of nontoxic goiter, recurrent ear/sinus infections who presents for rheumatology consult by request of Dr Matt Gary for polyarticular TEA  No prior rheumatologic records are available for review    He recalls seeing a rheumatologist as a teenager around age 13 for evaluation of joint pain, and was told he had juvenile onset RA  He recalls a prescription medication that he took on a daily basis but cannot recall the name and has been off it for several years  He has not had issues since being off of it  His main joint pain ever since a teenager has always been in his Fingers, knees, and toes  He describes minimal lower back stiffness in the morning that resolved in under 5 minutes  Any peripheral joint stiffness also resolved in under 5 minutes  He denies any recent joint swelling, symptoms of dactylitis or enthesitis  Reports a history of "colitis" but does not think it is ulcerative colitis or Crohn's  He states this was diagnosed around the same time as he was seeing the rheumatologist but he cannot recall what type of treatment he received for colitis, and has not been on any form of treatment for years  Denies any symptoms from this including abdominal pain, diarrhea or blood in his stool  Denies a history of inflammatory eye disease  Currently only taking ibuprofen or Tylenol once per month for occasional joint pain  Denies photosensitivity, rashes, psoriasis, sicca symptoms, oral or nasal ulcers, alopecia, Raynaud's, h/o pericarditis or pleurisy, h/o blood clots  Feels very well at this time but just wanted to make sure he should not be on any form of treatment  Review of Systems  Review of Systems   Constitutional: Negative for chills, fatigue, fever and unexpected weight change  HENT: Negative for mouth sores and trouble swallowing  Eyes: Negative for pain and visual disturbance  Respiratory: Negative for cough and shortness of breath  Cardiovascular: Negative for chest pain and leg swelling  Gastrointestinal: Negative for abdominal pain, blood in stool, constipation, diarrhea and nausea  Genitourinary: Negative for hematuria  Musculoskeletal: Positive for arthralgias and back pain   Negative for joint swelling and myalgias  Skin: Negative for rash  Neurological: Negative for weakness and numbness  Hematological: Negative for adenopathy  Psychiatric/Behavioral: Negative for sleep disturbance  Allergies  No Known Allergies    Home Medications    Current Outpatient Medications:     azelastine (ASTELIN) 0 1 % nasal spray, 2 sprays into each nostril 2 (two) times a day Use in each nostril as directed, Disp: 30 mL, Rfl: 3    Past Medical History  Past Medical History:   Diagnosis Date    Allergic rhinitis     [id number] RESOLVED    Blepharitis     [id number]  UNSPECIFIED LATERALITY   [id number] RESOLVED    Rheumatoid arthritis ([address] )     Viral gastroenteritis     [id number] RESOLVED       Past Surgical History   Past Surgical History:   Procedure Laterality Date    HERNIA REPAIR Bilateral     Infant       Family History  No known family history of autoimmune or inflammatory diseases  Family History   Problem Relation Age of Onset    Anxiety disorder Mother     Seizures Mother     Meniere's disease Father     Seizures Brother     Asperger's syndrome Brother        Social History  Occupation: works at [employer]   Social History     Substance and Sexual Activity   Alcohol Use Yes    Frequency: Monthly or less    Drinks per session: 1 or 2    Binge frequency: Never     Social History     Substance and Sexual Activity   Drug Use Yes    Frequency: 2 0 times per week    Types: Marijuana     Social History     Tobacco Use   Smoking Status Never Smoker   Smokeless Tobacco Never Used       Objective:    Vitals:    12/12/19 1409   BP: 123/64   BP Location: Left arm   Patient Position: Sitting   Cuff Size: Standard   Pulse: 70   Weight: 64 4 kg (142 lb)   Height: 6' (1 829 m)       Physical Exam   Constitutional: He appears well-developed and well-nourished  He is cooperative  No distress  HENT:   Head: Normocephalic and atraumatic     Mouth/Throat: Oropharynx is clear and moist and mucous membranes are normal  Eyes: Conjunctivae and EOM are normal  No scleral icterus  Neck: Neck supple  No spinous process tenderness and no muscular tenderness present  No thyromegaly present  Cardiovascular: Normal rate, regular rhythm, S1 normal and S2 normal  Exam reveals no friction rub  No murmur heard  Pulmonary/Chest: Breath sounds normal  No respiratory distress  He has no wheezes  He has no rhonchi  He has no rales  Musculoskeletal:   No reproducible soft tissue or joint tenderness  No joint swelling or synovitis anywhere  No joint deformity  Lymphadenopathy:     He has no cervical adenopathy  Neurological: He is alert  No sensory deficit  Skin: Skin is warm and dry  No rash noted  Nails show no clubbing  Psychiatric: He has a normal mood and affect         Imaging:   No MSK imaging for review     Labs:   Component      Latest Ref Rng & Units 11/29/2019   WBC      4 31 - 10 16 Thousand/uL 5 99   Red Blood Cell Count      3 88 - 5 62 Million/uL 5 16   Hemoglobin      12 0 - 17 0 g/dL 14 5   HCT      36 5 - 49 3 % 45 4   MCV      82 - 98 fL 88   MCH      26 8 - 34 3 pg 28 1   MCHC      31 4 - 37 4 g/dL 31 9   RDW      11 6 - 15 1 % 13 0   MPV      8 9 - 12 7 fL 10 3   Platelet Count      135 - 390 Thousands/uL 159   nRBC      /100 WBCs 0   Neutrophils %      43 - 75 % 63   Immat GRANS %      0 - 2 % 0   Lymphocytes Relative      14 - 44 % 23   Monocytes Relative      4 - 12 % 8   Eosinophils      0 - 6 % 5   Basophils Relative      0 - 1 % 1   Absolute Neutrophils      1 85 - 7 62 Thousands/µL 3 81   Immature Grans Absolute      0 00 - 0 20 Thousand/uL 0 01   Lymphocytes Absolute      0 60 - 4 47 Thousands/µL 1 36   Absolute Monocytes      0 17 - 1 22 Thousand/µL 0 48   Absolute Eosinophils      0 00 - 0 61 Thousand/µL 0 30   Basophils Absolute      0 00 - 0 10 Thousands/µL 0 03   Sodium      136 - 145 mmol/L 139   Potassium      3 5 - 5 3 mmol/L 3 5   Chloride      100 - 108 mmol/L 102   CO2      21 - 32 mmol/L 32   Anion Gap      4 - 13 mmol/L 5   BUN      5 - 25 mg/dL 14   Creatinine      0 60 - 1 30 mg/dL 1 28   Glucose, Random      65 - 140 mg/dL 214 (H)   Calcium      8 3 - 10 1 mg/dL 9 2   AST      5 - 45 U/L 15   ALT      12 - 78 U/L 16   Alkaline Phosphatase      46 - 116 U/L 63   Total Protein      6 4 - 8 2 g/dL 7 0   Albumin      3 5 - 5 0 g/dL 3 8   TOTAL BILIRUBIN      0 20 - 1 00 mg/dL 0 46   eGFR      ml/min/1 73sq m 78   Color, UA       -

## 2019-12-13 LAB — RHEUMATOID FACT SER QL LA: NEGATIVE

## 2019-12-14 LAB — CCP IGA+IGG SERPL IA-ACNC: 9 UNITS (ref 0–19)

## 2019-12-19 LAB — HLA-B27 QL NAA+PROBE: POSITIVE

## 2020-03-03 ENCOUNTER — OFFICE VISIT (OUTPATIENT)
Dept: FAMILY MEDICINE CLINIC | Facility: CLINIC | Age: 25
End: 2020-03-03
Payer: COMMERCIAL

## 2020-03-03 VITALS
WEIGHT: 144 LBS | TEMPERATURE: 99.8 F | SYSTOLIC BLOOD PRESSURE: 106 MMHG | BODY MASS INDEX: 19.5 KG/M2 | HEART RATE: 76 BPM | HEIGHT: 72 IN | DIASTOLIC BLOOD PRESSURE: 68 MMHG

## 2020-03-03 DIAGNOSIS — J01.90 ACUTE NON-RECURRENT SINUSITIS, UNSPECIFIED LOCATION: Primary | ICD-10-CM

## 2020-03-03 DIAGNOSIS — R73.9 HYPERGLYCEMIA: ICD-10-CM

## 2020-03-03 DIAGNOSIS — Z83.3 FAMILY HISTORY OF DIABETES MELLITUS: ICD-10-CM

## 2020-03-03 PROCEDURE — 1036F TOBACCO NON-USER: CPT | Performed by: FAMILY MEDICINE

## 2020-03-03 PROCEDURE — 3008F BODY MASS INDEX DOCD: CPT | Performed by: FAMILY MEDICINE

## 2020-03-03 PROCEDURE — 99213 OFFICE O/P EST LOW 20 MIN: CPT | Performed by: FAMILY MEDICINE

## 2020-03-03 RX ORDER — AZITHROMYCIN 500 MG/1
500 TABLET, FILM COATED ORAL DAILY
Qty: 3 TABLET | Refills: 0 | Status: SHIPPED | OUTPATIENT
Start: 2020-03-03 | End: 2020-03-06

## 2020-03-03 NOTE — PROGRESS NOTES
Assessment and Plan:  Follow up if not better  Problem List Items Addressed This Visit        Respiratory    Acute non-recurrent sinusitis - Primary     He was placed on Zithromax 500 mg 1 daily for 3 days and Mucinex D 1 twice a day keeping the 2nd dose away from bedtime  Relevant Medications    azithromycin (ZITHROMAX) 500 MG tablet       Other    Hyperglycemia     When he gets over this infection he will get a fasting glucose as well as a hemoglobin A1c  He does have a family history of diabetes  Relevant Orders    Hemoglobin A1C    Glucose, fasting    Family history of diabetes mellitus     His maternal grandmother had diabetes  Relevant Orders    Hemoglobin A1C    Glucose, fasting                 Diagnoses and all orders for this visit:    Acute non-recurrent sinusitis, unspecified location  -     azithromycin (ZITHROMAX) 500 MG tablet; Take 1 tablet (500 mg total) by mouth daily for 3 days    Hyperglycemia  -     Hemoglobin A1C  -     Glucose, fasting; Future    Family history of diabetes mellitus  -     Hemoglobin A1C  -     Glucose, fasting; Future              Subjective:      Patient ID: Katlyn Gonzalez is a 22 y o  male  CC:    Chief Complaint   Patient presents with    Flu Symptoms     Body aches, chills / sweats  Mild headache  Fever off / on  Symptoms started 3 - 4 days ago  -[facility]       HPI:    This is a 25-year-old male who comes in with achiness and chills for the past 2 or 3 days  His temperature has run between 99 and 100  He has had some fever off and on and mild headaches  He has some head congestion and postnasal drip but no cough  Denies any nausea, vomiting or diarrhea  His blood pressure today is 106/68 and his weight is 144 lb up 2 lb from the previous visit and his BMI is 19 5    His 2nd problem is he was in the ER and was told to get a test for diabetes because his blood sugar was 216 however he had eaten 30 minutes before and this was probably the reason why his sugar was elevated  The following portions of the patient's history were reviewed and updated as appropriate: allergies, current medications, past family history, past medical history, past social history, past surgical history and problem list       Review of Systems   Constitutional: Positive for chills and fever  Negative for fatigue  HENT: Positive for congestion, postnasal drip and sinus pressure  Negative for ear pain, rhinorrhea and sore throat  Respiratory: Negative for cough, chest tightness and wheezing  Cardiovascular: Negative for chest pain  Gastrointestinal: Negative for diarrhea, nausea and vomiting  Data to review:       Objective:    Vitals:    03/03/20 0801   BP: 106/68   BP Location: Left arm   Patient Position: Sitting   Cuff Size: Large   Pulse: 76   Temp: 99 8 °F (37 7 °C)   TempSrc: Oral   Weight: 65 3 kg (144 lb)   Height: 6' (1 829 m)        Physical Exam   Constitutional: He is oriented to person, place, and time  He appears well-developed and well-nourished  HENT:   Head: Normocephalic  Right Ear: External ear normal    Mouth/Throat: No oropharyngeal exudate  Left canal was occluded with wax and will get the ear syringed at a later date  Positive postnasal drip +1 erythema of posterior pharynx without exudates  Eyes: Pupils are equal, round, and reactive to light  Conjunctivae and EOM are normal    Neck: Normal range of motion  Neck supple  Cardiovascular: Normal rate, regular rhythm and normal heart sounds  Pulmonary/Chest: Effort normal and breath sounds normal  He has no wheezes  He has no rales  Abdominal: Soft  Bowel sounds are normal    Musculoskeletal: Normal range of motion  Lymphadenopathy:     He has cervical adenopathy  Neurological: He is alert and oriented to person, place, and time  Skin: Skin is warm  Psychiatric: He has a normal mood and affect   His behavior is normal  Judgment and thought content normal  Nursing note and vitals reviewed

## 2020-03-03 NOTE — ASSESSMENT & PLAN NOTE
He was placed on Zithromax 500 mg 1 daily for 3 days and Mucinex D 1 twice a day keeping the 2nd dose away from bedtime

## 2020-03-03 NOTE — ASSESSMENT & PLAN NOTE
When he gets over this infection he will get a fasting glucose as well as a hemoglobin A1c  He does have a family history of diabetes

## 2020-03-15 ENCOUNTER — OFFICE VISIT (OUTPATIENT)
Dept: URGENT CARE | Facility: MEDICAL CENTER | Age: 25
End: 2020-03-15
Payer: COMMERCIAL

## 2020-03-15 ENCOUNTER — HOSPITAL ENCOUNTER (EMERGENCY)
Facility: HOSPITAL | Age: 25
Discharge: HOME | End: 2020-03-15
Attending: EMERGENCY MEDICINE | Admitting: EMERGENCY MEDICINE
Payer: COMMERCIAL

## 2020-03-15 ENCOUNTER — APPOINTMENT (EMERGENCY)
Dept: ULTRASOUND IMAGING | Facility: HOSPITAL | Age: 25
End: 2020-03-15
Payer: COMMERCIAL

## 2020-03-15 VITALS
SYSTOLIC BLOOD PRESSURE: 122 MMHG | HEART RATE: 88 BPM | WEIGHT: 144.18 LBS | OXYGEN SATURATION: 100 % | DIASTOLIC BLOOD PRESSURE: 60 MMHG | RESPIRATION RATE: 18 BRPM | TEMPERATURE: 99.1 F | BODY MASS INDEX: 19.55 KG/M2

## 2020-03-15 VITALS
RESPIRATION RATE: 16 BRPM | DIASTOLIC BLOOD PRESSURE: 76 MMHG | SYSTOLIC BLOOD PRESSURE: 136 MMHG | TEMPERATURE: 98.4 F | WEIGHT: 144.4 LBS | BODY MASS INDEX: 19.58 KG/M2 | OXYGEN SATURATION: 98 % | HEART RATE: 70 BPM

## 2020-03-15 DIAGNOSIS — N50.812 LEFT TESTICULAR PAIN: Primary | ICD-10-CM

## 2020-03-15 DIAGNOSIS — N50.812 TESTICULAR PAIN, LEFT: Primary | ICD-10-CM

## 2020-03-15 LAB
BILIRUB UR QL STRIP: NEGATIVE
CLARITY UR: CLEAR
COLOR UR: YELLOW
COLOR, POC: YELLOW
GLUCOSE UR STRIP-MCNC: NEGATIVE MG/DL
HGB UR QL STRIP.AUTO: NEGATIVE
KETONES UR STRIP-MCNC: NEGATIVE MG/DL
LEUKOCYTE ESTERASE UR QL STRIP: NEGATIVE
NITRITE UR QL STRIP: NEGATIVE
PH UR STRIP.AUTO: 6.5 [PH] (ref 4.5–8)
PROT UR STRIP-MCNC: NEGATIVE MG/DL
SP GR UR STRIP.AUTO: 1.01 (ref 1–1.03)
UROBILINOGEN UR QL STRIP.AUTO: 0.2 E.U./DL

## 2020-03-15 PROCEDURE — G0382 LEV 3 HOSP TYPE B ED VISIT: HCPCS | Performed by: PHYSICIAN ASSISTANT

## 2020-03-15 PROCEDURE — 87591 N.GONORRHOEAE DNA AMP PROB: CPT | Performed by: EMERGENCY MEDICINE

## 2020-03-15 PROCEDURE — 76870 US EXAM SCROTUM: CPT

## 2020-03-15 PROCEDURE — 87491 CHLMYD TRACH DNA AMP PROBE: CPT | Performed by: EMERGENCY MEDICINE

## 2020-03-15 PROCEDURE — 99284 EMERGENCY DEPT VISIT MOD MDM: CPT

## 2020-03-15 PROCEDURE — 99282 EMERGENCY DEPT VISIT SF MDM: CPT | Performed by: EMERGENCY MEDICINE

## 2020-03-15 PROCEDURE — 81003 URINALYSIS AUTO W/O SCOPE: CPT

## 2020-03-15 NOTE — PROGRESS NOTES
[facility]        NAME: Randolph Watson is a 22 y o  male  : 1995    MRN: [id number]  DATE: March 15, 2020  TIME: 10:45 AM    Assessment and Plan   Left testicular pain [N50 812]  1  Left testicular pain           Patient Instructions     Patient sent to the ER for further evaluation and work up that could not be performed here  Chief Complaint     Chief Complaint   Patient presents with    Testicle Pain     Patient noticed last night that his left testicle was sore  This morning he noticed it looks a little swollen, no color difference  Patient states it hurts more when it is touched  History of Present Illness       Complaining of left testicular pain since last evening at 6pm  Pain is made worse with movement  Denies any nausea, vomiting  Never had this pain in the past  Sexually active with one partner  Denies symptoms in partner  Denies discharge from penis  Denies fever/chills, nausea, vomiting  Pain is made worse with movement  Review of Systems   Review of Systems   Constitutional: Negative for chills, fatigue and fever  HENT: Negative for congestion, ear pain, hearing loss, postnasal drip, sinus pressure, sinus pain and sore throat  Eyes: Negative for pain and discharge  Respiratory: Negative for chest tightness and shortness of breath  Cardiovascular: Negative for chest pain  Gastrointestinal: Negative for abdominal pain, constipation, nausea and vomiting  Genitourinary: Positive for testicular pain  Negative for difficulty urinating  Musculoskeletal: Negative for arthralgias and myalgias  Skin: Negative for rash  Neurological: Negative for dizziness and headaches  Psychiatric/Behavioral: Negative for behavioral problems  Current Medications     No current outpatient medications on file      Current Allergies     Allergies as of 03/15/2020    (No Known Allergies)            The following portions of the patient's history were reviewed and updated as appropriate: allergies, current medications, past family history, past medical history, past social history, past surgical history and problem list      Past Medical History:   Diagnosis Date    Allergic rhinitis     [id number] RESOLVED    Blepharitis     [id number]  UNSPECIFIED LATERALITY   [id number] RESOLVED    Rheumatoid arthritis ([address] )     Viral gastroenteritis     [id number] RESOLVED       Past Surgical History:   Procedure Laterality Date    HERNIA REPAIR Bilateral     Infant       Family History   Problem Relation Age of Onset    Anxiety disorder Mother     Seizures Mother     Meniere's disease Father     Seizures Brother     Asperger's syndrome Brother          Medications have been verified  Objective   /60 (BP Location: Left arm, Patient Position: Sitting, Cuff Size: Adult)   Pulse 88   Temp 99 1 °F (37 3 °C)   Resp 18   Wt 65 4 kg (144 lb 2 9 oz)   SpO2 100%   BMI 19 55 kg/m²        Physical Exam     Physical Exam   Constitutional: He appears well-developed and well-nourished  Cardiovascular: Normal rate  Pulmonary/Chest: Effort normal and breath sounds normal    Abdominal: Soft  Hernia confirmed negative in the right inguinal area and confirmed negative in the left inguinal area  Genitourinary: Testes normal  Cremasteric reflex is present  Uncircumcised  Lymphadenopathy: No inguinal adenopathy noted on the right or left side  Nursing note and vitals reviewed

## 2020-03-15 NOTE — ED PROVIDER NOTES
History  Chief Complaint   Patient presents with    Testicle Pain     Reports left testicular with "light redness," and swelling  Started last night  Denies penile discharge  This is an otherwise healthy 52-year-old male presents with left-sided testicular pain  Starting yesterday, the patient noticed pain in his left testicle  The pain has continued to worsen throughout the day today  Denies any inciting event or injury  Denies any history of STDs  The patient was seen at an urgent care today and told to come to the emergency department  Denies any penile pain/drainage  Denies any dysuria/hematuria, denies any testicular redness/swelling, denies any rashes, denies any inguinal lumps or bumps  Denies fever/chills, nausea/vomiting, lightheadedness/dizziness, numbness/weakness, headache, change in vision, URI symptoms, neck pain, chest pain, palpitations, shortness of breath, cough, back pain, flank pain, abdominal pain, diarrhea, hematochezia, melena, dysuria, hematuria  None       Past Medical History:   Diagnosis Date    Allergic rhinitis     [id number] RESOLVED    Blepharitis     [id number]  UNSPECIFIED LATERALITY   [id number] RESOLVED    Rheumatoid arthritis ([facility] [address] )     Viral gastroenteritis     [id number] RESOLVED       Past Surgical History:   Procedure Laterality Date    HERNIA REPAIR Bilateral     Infant       Family History   Problem Relation Age of Onset    Anxiety disorder Mother     Seizures Mother     Meniere's disease Father     Seizures Brother     Asperger's syndrome Brother      I have reviewed and agree with the history as documented  E-Cigarette/Vaping    E-Cigarette Use Never User      E-Cigarette/Vaping Substances     Social History     Tobacco Use    Smoking status: Never Smoker    Smokeless tobacco: Never Used   Substance Use Topics    Alcohol use: Yes     Frequency: Monthly or less     Drinks per session: 1 or 2     Binge frequency: Never    Drug use:  Yes Frequency: 2 0 times per week     Types: Marijuana       Review of Systems   Constitutional: Negative for chills, fatigue and fever  HENT: Negative for rhinorrhea, sore throat and trouble swallowing  Eyes: Negative for photophobia and visual disturbance  Respiratory: Negative for cough, chest tightness and shortness of breath  Cardiovascular: Negative for chest pain, palpitations and leg swelling  Gastrointestinal: Negative for abdominal pain, blood in stool, diarrhea, nausea and vomiting  Endocrine: Negative for polyuria  Genitourinary: Positive for testicular pain  Negative for discharge, dysuria, flank pain, genital sores, hematuria, penile pain, penile swelling and scrotal swelling  Musculoskeletal: Negative for back pain and neck pain  Skin: Negative for color change and rash  Allergic/Immunologic: Negative for immunocompromised state  Neurological: Negative for dizziness, weakness, light-headedness, numbness and headaches  All other systems reviewed and are negative  Physical Exam  Physical Exam   Constitutional: Vital signs are normal  He appears well-developed and well-nourished  He is cooperative  No distress  HENT:   Mouth/Throat: Uvula is midline and oropharynx is clear and moist    Eyes: Pupils are equal, round, and reactive to light  Conjunctivae, EOM and lids are normal    Neck: Trachea normal  No thyroid mass and no thyromegaly present  Cardiovascular: Normal rate, regular rhythm, normal heart sounds, intact distal pulses and normal pulses  No murmur heard  Pulmonary/Chest: Effort normal and breath sounds normal    Abdominal: Soft  Normal appearance and bowel sounds are normal  There is no tenderness  There is no rebound, no guarding, no CVA tenderness and negative Barrett's sign  Hernia confirmed negative in the right inguinal area and confirmed negative in the left inguinal area  Genitourinary: Penis normal  Cremasteric reflex is present   Right testis shows no mass, no swelling and no tenderness  Right testis is descended  Cremasteric reflex is not absent on the right side  Left testis shows tenderness  Left testis shows no mass and no swelling  Left testis is descended  Cremasteric reflex is not absent on the left side  Uncircumcised  No phimosis, paraphimosis, hypospadias, penile erythema or penile tenderness  No discharge found  Lymphadenopathy: No inguinal adenopathy noted on the right or left side  Neurological: He is alert  Skin: Skin is warm, dry and intact  Psychiatric: He has a normal mood and affect  His speech is normal and behavior is normal  Thought content normal        Vital Signs  ED Triage Vitals [03/15/20 1105]   Temperature Pulse Respirations Blood Pressure SpO2   98 4 °F (36 9 °C) 83 16 131/74 98 %      Temp Source Heart Rate Source Patient Position - Orthostatic VS BP Location FiO2 (%)   Temporal Monitor Sitting Right arm --      Pain Score       No Pain           Vitals:    03/15/20 1105 03/15/20 1228   BP: 131/74 136/76   Pulse: 83 70   Patient Position - Orthostatic VS: Sitting Lying         Visual Acuity      ED Medications  Medications - No data to display    Diagnostic Studies  Results Reviewed     Procedure Component Value Units Date/Time    Chlamydia/GC amplified DNA by PCR [[id number]] Collected:  03/15/20 1150    Lab Status:   In process Specimen:  Urine, Other Updated:  03/15/20 1154    POCT urinalysis dipstick [[id number]]  (Normal) Resulted:  03/15/20 1149    Lab Status:  Final result Specimen:  Urine Updated:  03/15/20 1150     Color, UA yellow    Urine Macroscopic, POC [[id number]] Collected:  03/15/20 1147    Lab Status:  Final result Specimen:  Urine Updated:  03/15/20 1148     Color, UA Yellow     Clarity, UA Clear     pH, UA 6 5     Leukocytes, UA Negative     Nitrite, UA Negative     Protein, UA Negative mg/dl      Glucose, UA Negative mg/dl      Ketones, UA Negative mg/dl      Urobilinogen, UA 0 2 E U /dl      Bilirubin, UA Negative     Blood, UA Negative     Specific Gravity, UA 1 010    Narrative:       CLINITEK RESULT                 US scrotum and testicles   Final Result by Sandrita Mcfadden MD (03/15 2008)       1  Left varicocele   2  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 without intratesticular mass or other worrisome findings  In the absence of any other risk factors for testicular cancer (e g , personal history of testicular cancer, father or brother with    testicular cancer, history of cryptorchidism for maldescent, testicular atrophy, or other risk factors), no further imaging or biochemical follow-up is necessary; all that is recommended is routine monthly testicular self-examination  However if the    patient has risk factors for testicular cancer, evaluation and determination of an optimal follow-up strategy is deferred to urologist  (Reference: AJR 2016: [id number] )      The study was marked in EPIC for significant notification  Workstation performed: [id number]                    Procedures  Procedures         ED Course                                 MDM  Number of Diagnoses or Management Options  Diagnosis management comments: Plan to check urinalysis with GC/chlamydia  Ultrasound left testicle  Likely epididymitis  Disposition pending results  Disposition  Final diagnoses:   Testicular pain, left     Time reflects when diagnosis was documented in both MDM as applicable and the Disposition within this note     Time User Action Codes Description Comment    3/15/2020 12:57 PM Vanda Saldana Add [M08 [id number]] Testicular pain, left       ED Disposition     ED Disposition Condition Date/Time Comment    Discharge Stable Sun Mar 15, 2020 12:57 PM Sergey Montenegro discharge to home/self care              Follow-up Information     Follow up With Specialties Details Why Contact Info Additional [facility] Emergency Department Emergency Medicine Go to  If symptoms worsen [facility] [address]  [phone] [place] ED, [address]  , [place], [place], [address] [facility] Schedule an appointment as soon as possible for a visit  for testicular microlithiasis of right testicle and left testicular pain [facility]  [address]  [place] [id number]  [address] [place] [address] For Urology [address], [address], [facility], [place], [id number] [phone]          Patient's Medications    No medications on file     No discharge procedures on file      PDMP Review     None          ED Provider  Electronically Signed by           Itzel Araujo MD  03/15/20 5830

## 2020-03-15 NOTE — DISCHARGE INSTRUCTIONS
FINDINGS:     TESTES:   Testes are symmetric and normal in size  RIGHT testis = 3 6 x 2 2 x 2 3 cm   Normal contour with homogeneous smooth echotexture  No intratesticular mass lesion  2 foci of microcalcification noted  LEFT testis = 4 4 x 2 2 x 2 7 cm  Normal contour with homogeneous smooth echotexture  No intratesticular mass lesion or calcifications  Doppler flow within both testes is present and symmetric  EPIDIDYMIDES:   Normal Size  Doppler ultrasound demonstrates hyperemia in the right epididymis  No epididymal lesions  HYDROCELE:  No significant fluid present  VARICOCELE:  Present on left     SCROTUM:  Scrotal thickness and appearance within normal limits  No evidence for extratesticular mass or hernia demonstrated  IMPRESSION:     1  Left varicocele  2  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 without intratesticular mass or other worrisome findings  In the absence of any other risk factors for testicular cancer (e g , personal history of testicular cancer, father or brother with   testicular cancer, history of cryptorchidism for maldescent, testicular atrophy, or other risk factors), no further imaging or biochemical follow-up is necessary; all that is recommended is routine monthly testicular self-examination    However if the   patient has risk factors for testicular cancer, evaluation and determination of an optimal follow-up strategy is deferred to urologist  (Reference: AJR 2016: [id number] )

## 2020-03-17 LAB
C TRACH DNA SPEC QL NAA+PROBE: NEGATIVE
N GONORRHOEA DNA SPEC QL NAA+PROBE: NEGATIVE

## 2020-05-22 ENCOUNTER — TELEMEDICINE (OUTPATIENT)
Dept: FAMILY MEDICINE CLINIC | Facility: CLINIC | Age: 25
End: 2020-05-22
Payer: COMMERCIAL

## 2020-05-22 VITALS — WEIGHT: 144 LBS | TEMPERATURE: 98.8 F | BODY MASS INDEX: 19.53 KG/M2

## 2020-05-22 DIAGNOSIS — J01.90 ACUTE NON-RECURRENT SINUSITIS, UNSPECIFIED LOCATION: Primary | ICD-10-CM

## 2020-05-22 PROCEDURE — 99213 OFFICE O/P EST LOW 20 MIN: CPT | Performed by: FAMILY MEDICINE

## 2020-05-22 RX ORDER — AZITHROMYCIN 500 MG/1
500 TABLET, FILM COATED ORAL DAILY
Qty: 3 TABLET | Refills: 0 | Status: SHIPPED | OUTPATIENT
Start: 2020-05-22 | End: 2020-05-25

## 2020-06-10 ENCOUNTER — HOSPITAL ENCOUNTER (EMERGENCY)
Facility: HOSPITAL | Age: 25
Discharge: HOME | End: 2020-06-11
Attending: EMERGENCY MEDICINE | Admitting: EMERGENCY MEDICINE
Payer: COMMERCIAL

## 2020-06-10 DIAGNOSIS — N50.812 TESTICULAR PAIN, LEFT: ICD-10-CM

## 2020-06-10 DIAGNOSIS — I86.1 LEFT VARICOCELE: Primary | ICD-10-CM

## 2020-06-10 LAB
BILIRUB UR QL STRIP: NEGATIVE
CLARITY UR: CLEAR
COLOR UR: YELLOW
GLUCOSE UR STRIP-MCNC: NEGATIVE MG/DL
HGB UR QL STRIP.AUTO: NEGATIVE
KETONES UR STRIP-MCNC: NEGATIVE MG/DL
LEUKOCYTE ESTERASE UR QL STRIP: NEGATIVE
NITRITE UR QL STRIP: NEGATIVE
PH UR STRIP.AUTO: 5.5 [PH] (ref 4.5–8)
PROT UR STRIP-MCNC: ABNORMAL MG/DL
SP GR UR STRIP.AUTO: 1.02 (ref 1–1.03)
UROBILINOGEN UR QL STRIP.AUTO: 0.2 E.U./DL

## 2020-06-10 PROCEDURE — 81001 URINALYSIS AUTO W/SCOPE: CPT

## 2020-06-10 PROCEDURE — 87591 N.GONORRHOEAE DNA AMP PROB: CPT | Performed by: NURSE PRACTITIONER

## 2020-06-10 PROCEDURE — 99284 EMERGENCY DEPT VISIT MOD MDM: CPT | Performed by: NURSE PRACTITIONER

## 2020-06-10 PROCEDURE — 99284 EMERGENCY DEPT VISIT MOD MDM: CPT

## 2020-06-10 PROCEDURE — 87491 CHLMYD TRACH DNA AMP PROBE: CPT | Performed by: NURSE PRACTITIONER

## 2020-06-10 RX ORDER — IBUPROFEN 400 MG/1
400 TABLET ORAL ONCE
Status: COMPLETED | OUTPATIENT
Start: 2020-06-10 | End: 2020-06-10

## 2020-06-10 RX ORDER — ACETAMINOPHEN 325 MG/1
975 TABLET ORAL ONCE
Status: COMPLETED | OUTPATIENT
Start: 2020-06-10 | End: 2020-06-10

## 2020-06-10 RX ADMIN — ACETAMINOPHEN 975 MG: 325 TABLET ORAL at 23:22

## 2020-06-10 RX ADMIN — IBUPROFEN 400 MG: 400 TABLET ORAL at 23:22

## 2020-06-11 ENCOUNTER — APPOINTMENT (EMERGENCY)
Dept: ULTRASOUND IMAGING | Facility: HOSPITAL | Age: 25
End: 2020-06-11
Payer: COMMERCIAL

## 2020-06-11 VITALS
HEART RATE: 61 BPM | BODY MASS INDEX: 18.39 KG/M2 | TEMPERATURE: 97.9 F | WEIGHT: 135.58 LBS | RESPIRATION RATE: 16 BRPM | OXYGEN SATURATION: 95 % | DIASTOLIC BLOOD PRESSURE: 69 MMHG | SYSTOLIC BLOOD PRESSURE: 137 MMHG

## 2020-06-11 LAB
BACTERIA UR QL AUTO: ABNORMAL /HPF
MUCOUS THREADS UR QL AUTO: ABNORMAL
NON-SQ EPI CELLS URNS QL MICRO: ABNORMAL /HPF
RBC #/AREA URNS AUTO: ABNORMAL /HPF
WBC #/AREA URNS AUTO: ABNORMAL /HPF

## 2020-06-11 PROCEDURE — 76870 US EXAM SCROTUM: CPT

## 2020-06-13 LAB
C TRACH DNA SPEC QL NAA+PROBE: NEGATIVE
N GONORRHOEA DNA SPEC QL NAA+PROBE: NEGATIVE

## 2020-08-18 ENCOUNTER — OFFICE VISIT (OUTPATIENT)
Dept: URGENT CARE | Facility: MEDICAL CENTER | Age: 25
End: 2020-08-18
Payer: COMMERCIAL

## 2020-08-18 VITALS
OXYGEN SATURATION: 96 % | HEART RATE: 90 BPM | DIASTOLIC BLOOD PRESSURE: 68 MMHG | BODY MASS INDEX: 17.61 KG/M2 | HEIGHT: 72 IN | RESPIRATION RATE: 16 BRPM | WEIGHT: 130 LBS | SYSTOLIC BLOOD PRESSURE: 149 MMHG | TEMPERATURE: 98.7 F

## 2020-08-18 DIAGNOSIS — S05.01XA CONJUNCTIVAL ABRASION, RIGHT, INITIAL ENCOUNTER: Primary | ICD-10-CM

## 2020-08-18 PROCEDURE — G0382 LEV 3 HOSP TYPE B ED VISIT: HCPCS | Performed by: PHYSICIAN ASSISTANT

## 2020-08-18 RX ORDER — AZELASTINE HYDROCHLORIDE 0.5 MG/ML
1 SOLUTION/ DROPS OPHTHALMIC 2 TIMES DAILY PRN
Qty: 6 ML | Refills: 0 | Status: SHIPPED | OUTPATIENT
Start: 2020-08-18 | End: 2020-10-01

## 2020-08-18 RX ORDER — OFLOXACIN 3 MG/ML
1 SOLUTION/ DROPS OPHTHALMIC 4 TIMES DAILY
Qty: 5 ML | Refills: 0 | Status: SHIPPED | OUTPATIENT
Start: 2020-08-18 | End: 2020-08-25

## 2020-08-18 NOTE — PATIENT INSTRUCTIONS
Eye Foreign Body   WHAT YOU NEED TO KNOW:   You may have pain, sensitivity to light, or blurry vision for a few days  DISCHARGE INSTRUCTIONS:   Return to the emergency department if:   · You suddenly lose your vision  · You have severe eye pain  Contact your healthcare provider or ophthalmologist if:   · You have new or worse eye swelling  · Your symptoms do not get better, even after the foreign body is removed  · You have white or yellow fluid draining from your eye  · You have questions or concerns about your condition or care  Medicines: You may  need any of the following:  · Eye drops or eye ointment  may be given to prevent an infection and decrease pain  · NSAIDs , such as ibuprofen, help decrease swelling, pain, and fever  NSAIDs can cause stomach bleeding or kidney problems in certain people  If you take blood thinner medicine, always ask your healthcare provider if NSAIDs are safe for you  Always read the medicine label and follow directions  · Prescription pain medicine  may be given  Ask your healthcare provider how to take this medicine safely  Some prescription pain medicines contain acetaminophen  Do not take other medicines that contain acetaminophen without talking to your healthcare provider  Too much acetaminophen may cause liver damage  Prescription pain medicine may cause constipation  Ask your healthcare provider how to prevent or treat constipation  · Take your medicine as directed  Contact your healthcare provider if you think your medicine is not helping or if you have side effects  Tell him of her if you are allergic to any medicine  Keep a list of the medicines, vitamins, and herbs you take  Include the amounts, and when and why you take them  Bring the list or the pill bottles to follow-up visits  Carry your medicine list with you in case of an emergency  Help your eye heal:   · Do not rub your eye  This may cause more damage or infection       · Do not wear your contacts lenses until your eye heals  Ask your healthcare provider how long to follow this direction  · Wear sunglasses as directed  Sunglasses help protect the eye and decrease sensitivity to light  Prevent another EFB:   · Wear safety glasses, eye shields, or goggles  These items can prevent eye injury  Make sure the eyewear wraps around the sides of your face  Wear these items while you work with chemicals, metal, wood, or bodily fluids such as blood  Also wear protective eyewear during sports such as racquetball or swimming  Do not use regular eye glasses for eye protection  They will not protect your eyes from foreign bodies or chemicals  · Use contact lenses as directed  Wash your hands before you clean, insert, or remove your contacts  Insert and remove contact lenses correctly  Clean and change your contacts as directed to help prevent eye damage or infection  Follow up with your healthcare provider or ophthalmologist in 1 to 2 days:  Write down your questions so you remember to ask them during your visits  © 2017 [facility] Information is for End User's use only and may not be sold, redistributed or otherwise used for commercial purposes  All illustrations and images included in CareNotes® are the copyrighted property of [employer]  or Michael Puente  The above information is an  only  It is not intended as medical advice for individual conditions or treatments  Talk to your doctor, nurse or pharmacist before following any medical regimen to see if it is safe and effective for you

## 2020-08-18 NOTE — PROGRESS NOTES
[facility]        NAME: Jon Sultana is a 26 y o  male  : 1995    MRN: [id number]  DATE: 2020  TIME: 11:19 AM    Assessment and Plan   Conjunctival abrasion, right, initial encounter [S05 [id number]]  1  Conjunctival abrasion, right, initial encounter  ofloxacin (OCUFLOX) 0 3 % ophthalmic solution    azelastine (OPTIVAR) 0 05 % ophthalmic solution         Patient Instructions     Use ofloxacin drops, one drop to the right eye every 6 hours (4 times a day) for the next 7 days  Use azelastine drops for itching up to 2 times a day as needed  See ophthalmology if any visual changes  Follow up with PCP in 3-5 days  Proceed to  ER if symptoms worsen  Chief Complaint     Chief Complaint   Patient presents with    Eye Redness     Pt c/o right eye redness x 2 days  Began after a mosquito landed in his eye  Denies blurred vision or pain          History of Present Illness       Eye Problem    The right eye is affected  This is a new problem  The current episode started in the past 7 days (2 days)  Injury mechanism: mosquito flew into patient's R eye  The patient is experiencing no pain  He does not wear contacts  Associated symptoms include eye redness and itching  Pertinent negatives include no blurred vision, eye discharge, double vision, fever, foreign body sensation or photophobia  He has tried nothing for the symptoms  Review of Systems   Review of Systems   Constitutional: Negative for chills and fever  Eyes: Positive for redness and itching  Negative for blurred vision, double vision, photophobia, pain, discharge and visual disturbance           Current Medications       Current Outpatient Medications:     azelastine (OPTIVAR) 0 05 % ophthalmic solution, Administer 1 drop to the right eye 2 (two) times a day as needed (itching), Disp: 6 mL, Rfl: 0    ofloxacin (OCUFLOX) 0 3 % ophthalmic solution, Administer 1 drop to the right eye 4 (four) times a day for 7 days, Disp: 5 mL, Rfl: 0    Current Allergies     Allergies as of 08/18/2020    (No Known Allergies)            The following portions of the patient's history were reviewed and updated as appropriate: allergies, current medications, past family history, past medical history, past social history, past surgical history and problem list      Past Medical History:   Diagnosis Date    Allergic rhinitis     [id number] RESOLVED    Blepharitis     [id number]  UNSPECIFIED LATERALITY   [id number] RESOLVED    Rheumatoid arthritis ([address] )     Viral gastroenteritis     [id number] RESOLVED       Past Surgical History:   Procedure Laterality Date    HERNIA REPAIR Bilateral     Infant       Family History   Problem Relation Age of Onset    Anxiety disorder Mother     Seizures Mother     Meniere's disease Father     Seizures Brother     Asperger's syndrome Brother          Medications have been verified  Objective   /68   Pulse 90   Temp 98 7 °F (37 1 °C)   Resp 16   Ht 6' (1 829 m)   Wt 59 kg (130 lb)   SpO2 96%   BMI 17 63 kg/m²        Physical Exam     Physical Exam  Vitals signs and nursing note reviewed  Constitutional:       General: He is not in acute distress  Appearance: Normal appearance  He is not ill-appearing  Eyes:      General: Lids are normal  Lids are everted, no foreign bodies appreciated  Vision grossly intact  Right eye: No foreign body or discharge  Left eye: No foreign body or discharge  Extraocular Movements: Extraocular movements intact  Conjunctiva/sclera:      Right eye: Right conjunctiva is injected (conjunctiva injected in area of lateral canthus)  No chemosis, exudate or hemorrhage  Left eye: No chemosis or hemorrhage  Comments: No TTP of orbits or globes b/l  No rashes or erythema of periorbital area  No pain with EOM      Neurological:      Mental Status: He is alert     Psychiatric:         Mood and Affect: Mood normal          Behavior: Behavior normal

## 2020-08-28 ENCOUNTER — APPOINTMENT (EMERGENCY)
Dept: CT IMAGING | Facility: HOSPITAL | Age: 25
End: 2020-08-28
Payer: COMMERCIAL

## 2020-08-28 ENCOUNTER — HOSPITAL ENCOUNTER (OUTPATIENT)
Facility: HOSPITAL | Age: 25
Setting detail: OBSERVATION
Discharge: HOME | End: 2020-08-29
Attending: EMERGENCY MEDICINE | Admitting: HOSPITALIST
Payer: COMMERCIAL

## 2020-08-28 DIAGNOSIS — R10.9 ABDOMINAL PAIN: ICD-10-CM

## 2020-08-28 DIAGNOSIS — R93.5 ABNORMAL ABDOMINAL CT SCAN: ICD-10-CM

## 2020-08-28 DIAGNOSIS — E44.0 MODERATE PROTEIN-CALORIE MALNUTRITION (HCC): Chronic | ICD-10-CM

## 2020-08-28 DIAGNOSIS — K52.9 COLITIS: Primary | ICD-10-CM

## 2020-08-28 PROBLEM — D72.829 LEUKOCYTOSIS: Status: ACTIVE | Noted: 2020-08-28

## 2020-08-28 LAB
ALBUMIN SERPL BCP-MCNC: 4.2 G/DL (ref 3.5–5)
ALP SERPL-CCNC: 55 U/L (ref 46–116)
ALT SERPL W P-5'-P-CCNC: 27 U/L (ref 12–78)
ANION GAP SERPL CALCULATED.3IONS-SCNC: 9 MMOL/L (ref 4–13)
AST SERPL W P-5'-P-CCNC: 20 U/L (ref 5–45)
BASOPHILS # BLD AUTO: 0.05 THOUSANDS/ΜL (ref 0–0.1)
BASOPHILS NFR BLD AUTO: 0 % (ref 0–1)
BILIRUB SERPL-MCNC: 0.48 MG/DL (ref 0.2–1)
BUN SERPL-MCNC: 19 MG/DL (ref 5–25)
CALCIUM SERPL-MCNC: 9.4 MG/DL (ref 8.3–10.1)
CHLORIDE SERPL-SCNC: 102 MMOL/L (ref 100–108)
CO2 SERPL-SCNC: 29 MMOL/L (ref 21–32)
CREAT SERPL-MCNC: 1.1 MG/DL (ref 0.6–1.3)
CRP SERPL QL: <3 MG/L
EOSINOPHIL # BLD AUTO: 0.18 THOUSAND/ΜL (ref 0–0.61)
EOSINOPHIL NFR BLD AUTO: 2 % (ref 0–6)
ERYTHROCYTE [DISTWIDTH] IN BLOOD BY AUTOMATED COUNT: 12.9 % (ref 11.6–15.1)
GFR SERPL CREATININE-BSD FRML MDRD: 93 ML/MIN/1.73SQ M
GLUCOSE SERPL-MCNC: 102 MG/DL (ref 65–140)
HCT VFR BLD AUTO: 43.4 % (ref 36.5–49.3)
HGB BLD-MCNC: 14 G/DL (ref 12–17)
IMM GRANULOCYTES # BLD AUTO: 0.05 THOUSAND/UL (ref 0–0.2)
IMM GRANULOCYTES NFR BLD AUTO: 0 % (ref 0–2)
LIPASE SERPL-CCNC: 182 U/L (ref 73–393)
LYMPHOCYTES # BLD AUTO: 1.98 THOUSANDS/ΜL (ref 0.6–4.47)
LYMPHOCYTES NFR BLD AUTO: 16 % (ref 14–44)
MCH RBC QN AUTO: 27.9 PG (ref 26.8–34.3)
MCHC RBC AUTO-ENTMCNC: 32.3 G/DL (ref 31.4–37.4)
MCV RBC AUTO: 87 FL (ref 82–98)
MONOCYTES # BLD AUTO: 0.77 THOUSAND/ΜL (ref 0.17–1.22)
MONOCYTES NFR BLD AUTO: 6 % (ref 4–12)
NEUTROPHILS # BLD AUTO: 9.21 THOUSANDS/ΜL (ref 1.85–7.62)
NEUTS SEG NFR BLD AUTO: 76 % (ref 43–75)
NRBC BLD AUTO-RTO: 0 /100 WBCS
PLATELET # BLD AUTO: 162 THOUSANDS/UL (ref 149–390)
PMV BLD AUTO: 10.2 FL (ref 8.9–12.7)
POTASSIUM SERPL-SCNC: 3.5 MMOL/L (ref 3.5–5.3)
PROT SERPL-MCNC: 8 G/DL (ref 6.4–8.2)
RBC # BLD AUTO: 5.01 MILLION/UL (ref 3.88–5.62)
SODIUM SERPL-SCNC: 140 MMOL/L (ref 136–145)
WBC # BLD AUTO: 12.24 THOUSAND/UL (ref 4.31–10.16)

## 2020-08-28 PROCEDURE — 99285 EMERGENCY DEPT VISIT HI MDM: CPT

## 2020-08-28 PROCEDURE — 96374 THER/PROPH/DIAG INJ IV PUSH: CPT

## 2020-08-28 PROCEDURE — 36415 COLL VENOUS BLD VENIPUNCTURE: CPT

## 2020-08-28 PROCEDURE — 96361 HYDRATE IV INFUSION ADD-ON: CPT

## 2020-08-28 PROCEDURE — 85025 COMPLETE CBC W/AUTO DIFF WBC: CPT | Performed by: EMERGENCY MEDICINE

## 2020-08-28 PROCEDURE — G1004 CDSM NDSC: HCPCS

## 2020-08-28 PROCEDURE — 99245 OFF/OP CONSLTJ NEW/EST HI 55: CPT | Performed by: INTERNAL MEDICINE

## 2020-08-28 PROCEDURE — 74177 CT ABD & PELVIS W/CONTRAST: CPT

## 2020-08-28 PROCEDURE — 99219 PR INITIAL OBSERVATION CARE/DAY 50 MINUTES: CPT | Performed by: HOSPITALIST

## 2020-08-28 PROCEDURE — 87046 STOOL CULTR AEROBIC BACT EA: CPT

## 2020-08-28 PROCEDURE — 87427 SHIGA-LIKE TOXIN AG IA: CPT

## 2020-08-28 PROCEDURE — 96375 TX/PRO/DX INJ NEW DRUG ADDON: CPT

## 2020-08-28 PROCEDURE — 80053 COMPREHEN METABOLIC PANEL: CPT | Performed by: EMERGENCY MEDICINE

## 2020-08-28 PROCEDURE — 99285 EMERGENCY DEPT VISIT HI MDM: CPT | Performed by: EMERGENCY MEDICINE

## 2020-08-28 PROCEDURE — 83690 ASSAY OF LIPASE: CPT | Performed by: EMERGENCY MEDICINE

## 2020-08-28 PROCEDURE — 86140 C-REACTIVE PROTEIN: CPT | Performed by: PHYSICIAN ASSISTANT

## 2020-08-28 RX ORDER — SODIUM CHLORIDE 9 MG/ML
100 INJECTION, SOLUTION INTRAVENOUS CONTINUOUS
Status: DISCONTINUED | OUTPATIENT
Start: 2020-08-28 | End: 2020-08-29 | Stop reason: HOSPADM

## 2020-08-28 RX ORDER — ONDANSETRON 2 MG/ML
4 INJECTION INTRAMUSCULAR; INTRAVENOUS ONCE AS NEEDED
Status: DISCONTINUED | OUTPATIENT
Start: 2020-08-28 | End: 2020-08-28

## 2020-08-28 RX ORDER — KETOROLAC TROMETHAMINE 30 MG/ML
15 INJECTION, SOLUTION INTRAMUSCULAR; INTRAVENOUS ONCE
Status: COMPLETED | OUTPATIENT
Start: 2020-08-28 | End: 2020-08-28

## 2020-08-28 RX ORDER — MORPHINE SULFATE 4 MG/ML
4 INJECTION, SOLUTION INTRAMUSCULAR; INTRAVENOUS ONCE
Status: COMPLETED | OUTPATIENT
Start: 2020-08-28 | End: 2020-08-28

## 2020-08-28 RX ORDER — KETOROLAC TROMETHAMINE 30 MG/ML
30 INJECTION, SOLUTION INTRAMUSCULAR; INTRAVENOUS EVERY 6 HOURS PRN
Status: DISCONTINUED | OUTPATIENT
Start: 2020-08-28 | End: 2020-08-29 | Stop reason: HOSPADM

## 2020-08-28 RX ORDER — DICYCLOMINE HYDROCHLORIDE 10 MG/1
10 CAPSULE ORAL
Status: DISCONTINUED | OUTPATIENT
Start: 2020-08-28 | End: 2020-08-29 | Stop reason: HOSPADM

## 2020-08-28 RX ORDER — ACETAMINOPHEN 325 MG/1
650 TABLET ORAL EVERY 6 HOURS PRN
Status: DISCONTINUED | OUTPATIENT
Start: 2020-08-28 | End: 2020-08-29 | Stop reason: HOSPADM

## 2020-08-28 RX ORDER — ONDANSETRON 2 MG/ML
4 INJECTION INTRAMUSCULAR; INTRAVENOUS EVERY 6 HOURS PRN
Status: DISCONTINUED | OUTPATIENT
Start: 2020-08-28 | End: 2020-08-29 | Stop reason: HOSPADM

## 2020-08-28 RX ORDER — ONDANSETRON 2 MG/ML
4 INJECTION INTRAMUSCULAR; INTRAVENOUS ONCE
Status: COMPLETED | OUTPATIENT
Start: 2020-08-28 | End: 2020-08-28

## 2020-08-28 RX ORDER — MORPHINE SULFATE 4 MG/ML
4 INJECTION, SOLUTION INTRAMUSCULAR; INTRAVENOUS ONCE AS NEEDED
Status: CANCELLED | OUTPATIENT
Start: 2020-08-28

## 2020-08-28 RX ORDER — MAGNESIUM HYDROXIDE/ALUMINUM HYDROXICE/SIMETHICONE 120; 1200; 1200 MG/30ML; MG/30ML; MG/30ML
30 SUSPENSION ORAL EVERY 6 HOURS PRN
Status: DISCONTINUED | OUTPATIENT
Start: 2020-08-28 | End: 2020-08-29 | Stop reason: HOSPADM

## 2020-08-28 RX ORDER — SODIUM CHLORIDE 9 MG/ML
75 INJECTION, SOLUTION INTRAVENOUS CONTINUOUS
Status: CANCELLED | OUTPATIENT
Start: 2020-08-28

## 2020-08-28 RX ADMIN — DICYCLOMINE HYDROCHLORIDE 10 MG: 10 CAPSULE ORAL at 22:05

## 2020-08-28 RX ADMIN — DICYCLOMINE HYDROCHLORIDE 10 MG: 10 CAPSULE ORAL at 16:47

## 2020-08-28 RX ADMIN — SODIUM CHLORIDE 100 ML/HR: 0.9 INJECTION, SOLUTION INTRAVENOUS at 04:43

## 2020-08-28 RX ADMIN — KETOROLAC TROMETHAMINE 30 MG: 30 INJECTION, SOLUTION INTRAMUSCULAR at 09:35

## 2020-08-28 RX ADMIN — ONDANSETRON 4 MG: 2 INJECTION INTRAMUSCULAR; INTRAVENOUS at 01:10

## 2020-08-28 RX ADMIN — KETOROLAC TROMETHAMINE 15 MG: 30 INJECTION, SOLUTION INTRAMUSCULAR at 03:20

## 2020-08-28 RX ADMIN — SODIUM CHLORIDE 1000 ML: 0.9 INJECTION, SOLUTION INTRAVENOUS at 01:21

## 2020-08-28 RX ADMIN — MORPHINE SULFATE 4 MG: 4 INJECTION INTRAVENOUS at 01:21

## 2020-08-28 RX ADMIN — IOHEXOL 100 ML: 350 INJECTION, SOLUTION INTRAVENOUS at 01:55

## 2020-08-28 RX ADMIN — SODIUM CHLORIDE 100 ML/HR: 0.9 INJECTION, SOLUTION INTRAVENOUS at 14:03

## 2020-08-28 NOTE — H&P
H&P- Jamie Colvin 1995, 26 y o  male MRN: [id number]    Unit/Bed#: E2 -01 Encounter: [id number]    Primary Care Provider: Edu Mills PA-C   Date and time admitted to hospital: 8/28/2020  1:03 AM        * Colitis  Assessment & Plan  · CT A/P:   · Portions of the small bowel demonstrate abnormal bowel wall thickening and hyperemia  A few of these loops demonstrate tapering into abnormally dilated small bowel loops which demonstrate fecalization of small bowel contents, suggesting stasis  There is also some bowel wall thickening, luminal narrowing, and hyperemia involving the midportion of the left colon  Crohn's disease should be considered  · Appendix is not definitely identified  If there is concern for acute appendicitis, follow-up would be recommended  · Clear liquid diet, IV hydration  · Fecal calprotectin and stool studies ordered  · Serial abdominal exams  · Pain control  · GI consult    Moderate protein-calorie malnutrition (HCC)  Assessment & Plan  Malnutrition Findings: BMI Findings: There is no height or weight on file to calculate BMI  BMI 17    · Denies recent weight loss or loss of appetite; states he struggles to gain weight  · Nutrition consult    Leukocytosis  Assessment & Plan  · WBC 12, likely secondary to inflammatory colitis  Monitor CBC      VTE Prophylaxis: Low risk  / reason for no mechanical VTE prophylaxis Ambulatory   Code Status:  Full code  POLST: POLST is not applicable to this patient  Discussion with family:  Girlfriend at bedside    Anticipated Length of Stay:  Patient will be admitted on an Observation basis with an anticipated length of stay of  < 2 midnights  Justification for Hospital Stay: colitis    Total Time for Visit, including Counseling / Coordination of Care: 45 minutes  Greater than 50% of this total time spent on direct patient counseling and coordination of care      Chief Complaint:   Abdominal pain    History of Present Illness:    Jenifer Rodríguez Mary Buckley is a 26 y o  male who presents with c/o abdominal pain  Patient reports sudden onset of abdominal pain at 8pm tonight, attempted to sleep it off but the pain became severe  Reports abdominal pain started as a dull pain in upper quadrants, is now located in right lower quadrant, described as sharp, nonradiating  Had a BM tonight described as solid and dark; denies mucus in stool or hematochezia  Denies fever, weight loss, loss of appetite, nausea, emesis, constipation or diarrhea  Denies sick contacts or recent antibiotic use  Review of Systems:    Review of Systems   Constitutional: Negative for activity change, appetite change, chills, diaphoresis, fatigue, fever and unexpected weight change  HENT: Negative  Eyes: Negative  Respiratory: Negative  Cardiovascular: Negative  Gastrointestinal: Positive for abdominal pain  Negative for abdominal distention, anal bleeding, blood in stool, constipation, diarrhea, nausea, rectal pain and vomiting  Endocrine: Negative  Genitourinary: Negative  Musculoskeletal: Negative  Skin: Negative  Neurological: Negative  Hematological: Negative  Psychiatric/Behavioral: Negative  Past Medical and Surgical History:     Past Medical History:   Diagnosis Date    Allergic rhinitis     [id number] RESOLVED    Blepharitis     [id number]  UNSPECIFIED LATERALITY   [id number] RESOLVED    Rheumatoid arthritis ([facility] [address] )     Viral gastroenteritis     [id number] RESOLVED       Past Surgical History:   Procedure Laterality Date    HERNIA REPAIR Bilateral     Infant       Meds/Allergies:    Prior to Admission medications    Medication Sig Start Date End Date Taking?  Authorizing Provider   azelastine (OPTIVAR) 0 05 % ophthalmic solution Administer 1 drop to the right eye 2 (two) times a day as needed (itching)  Patient not taking: Reported on 8/28/2020 8/18/20   Danette Mendieta PA-C     I have reviewed home medications with patient personally  Allergies: No Known Allergies    Social History:     Marital Status: Single   Occupation:  Works at [employer]  Patient Pre-hospital Living Situation:  Resides at home with parents  Patient Pre-hospital Level of Mobility:  Ambulatory  Patient Pre-hospital Diet Restrictions:   Substance Use History:   Social History     Substance and Sexual Activity   Alcohol Use Yes    Frequency: Monthly or less    Drinks per session: 1 or 2    Binge frequency: Never     Social History     Tobacco Use   Smoking Status Never Smoker   Smokeless Tobacco Never Used     Social History     Substance and Sexual Activity   Drug Use Yes    Frequency: 2 0 times per week    Types: Marijuana       Family History:    Family History   Problem Relation Age of Onset    Anxiety disorder Mother     Seizures Mother     Meniere's disease Father     Seizures Brother     Asperger's syndrome Brother        Physical Exam:     Vitals:   Blood Pressure: 129/70 (08/28/20 0319)  Pulse: 82 (08/28/20 0319)  Temperature: 97 6 °F (36 4 °C) (08/28/20 0319)  Temp Source: Oral (08/28/20 0319)  Respirations: 22 (08/28/20 0319)  SpO2: 98 % (08/28/20 0319)    Physical Exam  Constitutional:       General: He is not in acute distress  Appearance: Normal appearance  He is not ill-appearing, toxic-appearing or diaphoretic  Comments: Under weight   HENT:      Head: Normocephalic and atraumatic  Nose: No congestion or rhinorrhea  Mouth/Throat:      Mouth: Mucous membranes are moist    Eyes:      General: No scleral icterus  Neck:      Musculoskeletal: Normal range of motion and neck supple  Cardiovascular:      Rate and Rhythm: Normal rate and regular rhythm  Heart sounds: Normal heart sounds  Pulmonary:      Effort: Pulmonary effort is normal  No respiratory distress  Breath sounds: Normal breath sounds  No stridor  No wheezing, rhonchi or rales  Chest:      Chest wall: No tenderness     Abdominal:      General: Abdomen is flat  There is no distension  Palpations: Abdomen is soft  There is no mass  Tenderness: There is abdominal tenderness  There is no guarding or rebound  Hernia: No hernia is present  Comments: Moderate tenderness on palpation of RLQ  Increased bowel sounds   Musculoskeletal:         General: No swelling, tenderness or deformity  Skin:     General: Skin is warm and dry  Coloration: Skin is pale  Neurological:      General: No focal deficit present  Mental Status: He is alert and oriented to person, place, and time  Mental status is at baseline  Psychiatric:         Mood and Affect: Mood normal          Behavior: Behavior normal          Thought Content: Thought content normal          Judgment: Judgment normal        Additional Data:     Lab Results: I have personally reviewed pertinent reports  Results from last 7 days   Lab Units 08/28/20  0109   WBC Thousand/uL 12 24*   HEMOGLOBIN g/dL 14 0   HEMATOCRIT % 43 4   PLATELETS Thousands/uL 162   NEUTROS PCT % 76*   LYMPHS PCT % 16   MONOS PCT % 6   EOS PCT % 2     Results from last 7 days   Lab Units 08/28/20  0109   SODIUM mmol/L 140   POTASSIUM mmol/L 3 5   CHLORIDE mmol/L 102   CO2 mmol/L 29   BUN mg/dL 19   CREATININE mg/dL 1 10   ANION GAP mmol/L 9   CALCIUM mg/dL 9 4   ALBUMIN g/dL 4 2   TOTAL BILIRUBIN mg/dL 0 48   ALK PHOS U/L 55   ALT U/L 27   AST U/L 20   GLUCOSE RANDOM mg/dL 102                       Imaging: I have personally reviewed pertinent reports  CT abdomen pelvis with contrast   Final Result by Ricki Johnson MD (08/28 0249)      Portions of the small bowel demonstrate abnormal bowel wall thickening and hyperemia  A few of these loops demonstrate tapering into abnormally dilated small bowel loops which demonstrate fecalization of small bowel contents, suggesting stasis    There    is also some bowel wall thickening, luminal narrowing, and hyperemia involving the midportion of the left colon   Crohn's disease should be considered  Gastroenterology consultation and follow-up is recommended  The appendix is not definitely identified  If there is concern for acute appendicitis, follow-up would be recommended  I personally discussed this study with Janie Mcmahon on 8/28/2020 at 2:37 AM                      Workstation performed: [id number]             EKG, Pathology, and Other Studies Reviewed on Admission:   CT  Allscripts / Epic Records Reviewed: Yes     ** Please Note: This note has been constructed using a voice recognition system   **

## 2020-08-28 NOTE — CASE MANAGEMENT
This case management assistant (CMA) met with the patient and reviewed an obs notice with him  He did not have any questions about the obs notice and signed for this CMA  Unable to get adequate blood return from pt's midline catheter for morning labs. Changed pt to a lab collect in [employer].

## 2020-08-28 NOTE — UTILIZATION REVIEW
Initial Clinical Review    Admission: Date/Time/Statement:   Admission Orders (From admission, onward)     Ordered        08/28/20 0335  Place in Observation (expected length of stay for this patient is less than two midnights)  Once                   Orders Placed This Encounter   Procedures    Place in Observation (expected length of stay for this patient is less than two midnights)     Standing Status:   Standing     Number of Occurrences:   1     Order Specific Question:   Admitting Physician     Answer:   Tiffany Reagan [[id number]]     Order Specific Question:   Level of Care     Answer:   Med Surg [[id number]]     ED Arrival Information     Expected Arrival Acuity Means of Arrival Escorted By Service Admission Type    - 8/28/2020 00:58 Urgent Walk-In Self General Medicine Urgent    Arrival Complaint    abdominal pain        Chief Complaint   Patient presents with    Abdominal Pain     pt reports generalized abdominal sachi that started yesterday, now radiates to RLQ, reports nausea, hx of colitis but states it does not feel like colitis     Assessment/Plan:  25 y/o male with Pmhx of colitis who presents to ED with c/o abdominal pain that came on suddenly @ 8 pm tonight  Attempted to sleep it off but the pain became severe  Reports pain started as a dull pain in per quadrants but now located in the RLQ, describes as sharp non-radiating  Had a BM tonight which he described as solid and dark, denies mucus in stool or hematochezia  Abdomen tender on exam with palpation of RLQ  Increased bowel sounds  Pale  WBC 12 24  CT a/p with possible colitis, see result below  Admit observation to M/S unit -- clear liquid diet, IVF's  Stool for fecal calprotectin, cxs  Pain control  GI consult  GI consult 8/28 -- A: CT suggestive of enterocolitis  Colitis in 2016 and again now raises the concern for Crohn's disease as this could be a chronic process  Alternatively it could be a recurrent infectious enterocolitis   Plan: EGD/colonoscopy either as an inpatient on Monday or as an outpatient within the next 1 to 2 weeks  We will check his C-reactive protein and fecal calprotectin and try dicyclomine as needed for his pain  Reg diet for now  ED Triage Vitals   Temperature Pulse Respirations Blood Pressure SpO2   08/28/20 0105 08/28/20 0105 08/28/20 0105 08/28/20 0105 08/28/20 0105   (!) 97 3 °F (36 3 °C) 88 22 144/87 99 %      Temp Source Heart Rate Source Patient Position - Orthostatic VS BP Location FiO2 (%)   08/28/20 0319 08/28/20 0319 08/28/20 0319 08/28/20 0319 --   Oral Monitor Lying Right arm       Pain Score       08/28/20 0105       Worst Possible Pain          Wt Readings from Last 1 Encounters:   08/28/20 60 1 kg (132 lb 7 9 oz)     Additional Vital Signs:   Date/Time   Temp   Pulse   Resp   BP   SpO2   O2 Device    08/28/20 0700   97 5 °F (36 4 °C)   86   18   100/54   99 %   None (Room air)    08/28/20 0418   98 5 °F (36 9 °C)   71   20   133/80   97 %   None (Room air)    08/28/20 0319   97 6 °F (36 4 °C)   82   22   129/70   98 %   None (Room air)    08/28/20 0105   97 3 °F (36 3 °C)Abnormal     88   22   144/87   99 %   None (Room air)        Pertinent Labs/Diagnostic Test Results:   CT a/p 8/28 -- Portions of the small bowel demonstrate abnormal bowel wall thickening and hyperemia   A few of these loops demonstrate tapering into abnormally dilated small bowel loops which demonstrate fecalization of small bowel contents, suggesting stasis   There is also some bowel wall thickening, luminal narrowing, and hyperemia involving the midportion of the left colon   Crohn's disease should be considered   Gastroenterology consultation and follow-up is recommended     The appendix is not definitely identified   If there is concern for acute appendicitis, follow-up would be recommended      Results from last 7 days   Lab Units 08/28/20 0109   WBC Thousand/uL 12 24*   HEMOGLOBIN g/dL 14 0   HEMATOCRIT % 43 4   PLATELETS Thousands/uL 162   NEUTROS ABS Thousands/µL 9 21*     Results from last 7 days   Lab Units 08/28/20  0109   SODIUM mmol/L 140   POTASSIUM mmol/L 3 5   CHLORIDE mmol/L 102   CO2 mmol/L 29   ANION GAP mmol/L 9   BUN mg/dL 19   CREATININE mg/dL 1 10   EGFR ml/min/1 73sq m 93   CALCIUM mg/dL 9 4     Results from last 7 days   Lab Units 08/28/20  0109   AST U/L 20   ALT U/L 27   ALK PHOS U/L 55   TOTAL PROTEIN g/dL 8 0   ALBUMIN g/dL 4 2   TOTAL BILIRUBIN mg/dL 0 48       Results from last 7 days   Lab Units 08/28/20  0109   GLUCOSE RANDOM mg/dL 102         Results from last 7 days   Lab Units 08/28/20  0109   LIPASE u/L 182     Results from last 7 days   Lab Units 08/28/20  0109   CRP mg/L <3 0           ED Treatment:   Medication Administration from 08/28/2020 0058 to 08/28/2020 0408       Date/Time Order Dose Route Action     08/28/2020 0110 ondansetron (ZOFRAN) injection 4 mg 4 mg Intravenous Given     08/28/2020 0121 sodium chloride 0 9 % bolus 1,000 mL 1,000 mL Intravenous New Bag     08/28/2020 0121 morphine (PF) 4 mg/mL injection 4 mg 4 mg Intravenous Given     08/28/2020 0320 ketorolac (TORADOL) injection 15 mg 15 mg Intravenous Given     Past Medical History:   Diagnosis Date    Allergic rhinitis     [id number] RESOLVED    Blepharitis     [id number]  UNSPECIFIED LATERALITY   [id number] RESOLVED    Rheumatoid arthritis (HCC)     Viral gastroenteritis     [id number] RESOLVED     Present on Admission:   Colitis   Leukocytosis   Moderate protein-calorie malnutrition (HCC)      Admitting Diagnosis: Colitis [K52 9]  Abdominal pain [R10 9]  Abnormal abdominal CT scan [R93 5]  Moderate protein-calorie malnutrition ([facility] [address] ) [E44 0]  Age/Sex: 26 y o  male  Admission Orders:  Scheduled Medications:  dicyclomine, 10 mg, Oral, 4x Daily (AC & HS)    Continuous IV Infusions:  sodium chloride, 100 mL/hr, Intravenous, Continuous    PRN Meds:  acetaminophen, 650 mg, Oral, Q6H PRN  aluminum-magnesium hydroxide-simethicone, 30 mL, Oral, Q6H PRN  ketorolac, 30 mg, Intravenous, Q6H PRN  8/28 x1  morphine injection, 2 mg, Intravenous, Q4H PRN  ondansetron, 4 mg, Intravenous, Q6H PRN    OOB as marcy, SCD's, I/O's    IP CONSULT TO NUTRITION SERVICES  IP CONSULT TO GASTROENTEROLOGY    Network Utilization Review Department  [email or web address]  ATTENTION: Please call with any questions or concerns to [phone] and carefully listen to the prompts so that you are directed to the right person  All voicemails are confidential   [facility] all requests for admission clinical reviews, approved or denied determinations and any other requests to dedicated fax number below belonging to the campus where the patient is receiving treatment   List of dedicated fax numbers for the Facilities:  [address] DENIALS (Administrative/Medical Necessity) [phone]   [address] (Maternity/NICU/Pediatrics) [phone]   Mariana Saunders [phone]   [facility] [phone]   Flako Luu [phone]   Kianna Niño [phone]   [address] [facility] [address] [facility] [phone]   [facility]  [phone]   [address] [facility] [address]  [address] [facility] [address] [facility] [phone]

## 2020-08-28 NOTE — ASSESSMENT & PLAN NOTE
Has had at least 3 episodes of colitis since he was a teenager  Concerning for Crohns disease  GI and I spoke to him about the importance of following up as an ouptatient  He needs a colonoscopy in the near future  He is having no symptoms now      Ok to go home with prn Bentyl

## 2020-08-28 NOTE — MALNUTRITION/BMI
This medical record reflects one or more clinical indicators suggestive of malnutrition and/or morbid obesity  Malnutrition Findings:   Malnutrition type: Acute illness  Degree of Malnutrition: Malnutrition of moderate degree  Malnutrition Characteristics: Muscle loss, Inadequate energy    Pt exhibits acute moderate malnutrition r/t colitis as evidenced by mild muscle depletion in the temporalis and pectoralis regions, >7 5% weight loss in 3 months, and BMI of 17 97 treated by education on how to manage colitis symptoms and increase kcal/protein intake  BMI Findings:  BMI Classifications: Underweight < 18 5     Body mass index is 17 97 kg/m²  See Nutrition note dated 8/28/20 for additional details  Completed nutrition assessment is viewable in the nutrition documentation

## 2020-08-28 NOTE — ASSESSMENT & PLAN NOTE
· CT A/P:   · Portions of the small bowel demonstrate abnormal bowel wall thickening and hyperemia  A few of these loops demonstrate tapering into abnormally dilated small bowel loops which demonstrate fecalization of small bowel contents, suggesting stasis  There is also some bowel wall thickening, luminal narrowing, and hyperemia involving the midportion of the left colon  Crohn's disease should be considered  · Appendix is not definitely identified    If there is concern for acute appendicitis, follow-up would be recommended  · Clear liquid diet, IV hydration  · Fecal calprotectin and stool studies ordered  · Serial abdominal exams  · Pain control  · GI consult

## 2020-08-28 NOTE — ED PROVIDER NOTES
History  Chief Complaint   Patient presents with    Abdominal Pain     pt reports generalized abdominal sachi that started yesterday, now radiates to RLQ, reports nausea, hx of colitis but states it does not feel like colitis       History provided by:  Patient   used: No    Abdominal Pain   Pain location:  Periumbilical, epigastric and RLQ  Pain quality: sharp    Pain radiates to:  RLQ  Pain severity:  Moderate  Onset quality:  Gradual  Duration:  5 hours  Timing:  Constant  Progression:  Worsening  Chronicity:  New  Context: not alcohol use, not diet changes, not recent illness, not recent travel, not sick contacts, not suspicious food intake and not trauma    Worsened by: Movement and palpation  Associated symptoms: belching and nausea    Associated symptoms: no chest pain, no chills, no constipation, no cough, no diarrhea, no dysuria, no fever, no hematuria, no shortness of breath and no vomiting    Risk factors: no alcohol abuse, not elderly, has not had multiple surgeries, no NSAID use, not obese and no recent hospitalization        Prior to Admission Medications   Prescriptions Last Dose Informant Patient Reported?  Taking?   azelastine (OPTIVAR) 0 05 % ophthalmic solution Not Taking at Unknown time  No No   Sig: Administer 1 drop to the right eye 2 (two) times a day as needed (itching)   Patient not taking: Reported on 8/28/2020      Facility-Administered Medications: None       Past Medical History:   Diagnosis Date    Allergic rhinitis     [id number] RESOLVED    Blepharitis     [id number]  UNSPECIFIED LATERALITY   [id number] RESOLVED    Rheumatoid arthritis ([facility] [address] )     Viral gastroenteritis     [id number] RESOLVED       Past Surgical History:   Procedure Laterality Date    HERNIA REPAIR Bilateral     Infant       Family History   Problem Relation Age of Onset    Anxiety disorder Mother     Seizures Mother     Meniere's disease Father     Seizures Brother     Asperger's syndrome Brother      I have reviewed and agree with the history as documented  E-Cigarette/Vaping    E-Cigarette Use Never User      E-Cigarette/Vaping Substances     Social History     Tobacco Use    Smoking status: Never Smoker    Smokeless tobacco: Never Used   Substance Use Topics    Alcohol use: Yes     Frequency: Monthly or less     Drinks per session: 1 or 2     Binge frequency: Never    Drug use: Yes     Frequency: 2 0 times per week     Types: Marijuana       Review of Systems   Constitutional: Negative for chills and fever  Eyes: Negative for visual disturbance  Respiratory: Negative for cough, chest tightness and shortness of breath  Cardiovascular: Negative for chest pain and leg swelling  Gastrointestinal: Positive for abdominal pain and nausea  Negative for constipation, diarrhea and vomiting  Genitourinary: Negative for dysuria and hematuria  Musculoskeletal: Negative for back pain and neck pain  Skin: Negative for color change, pallor, rash and wound  Allergic/Immunologic: Negative for immunocompromised state  Neurological: Negative for dizziness, syncope and light-headedness  All other systems reviewed and are negative  Physical Exam  Physical Exam  Vitals signs and nursing note reviewed  Constitutional:       General: He is not in acute distress  Appearance: He is well-developed  He is not ill-appearing, toxic-appearing or diaphoretic  HENT:      Head: Normocephalic and atraumatic  Right Ear: External ear normal       Left Ear: External ear normal    Eyes:      General: No scleral icterus  Conjunctiva/sclera:      Right eye: Right conjunctiva is not injected  Left eye: Left conjunctiva is not injected  Neck:      Musculoskeletal: Normal range of motion  Cardiovascular:      Rate and Rhythm: Normal rate and regular rhythm  Pulses: Normal pulses  Pulmonary:      Effort: Pulmonary effort is normal  No respiratory distress     Abdominal: General: Abdomen is flat  Palpations: Abdomen is soft  Tenderness: There is abdominal tenderness in the right lower quadrant and periumbilical area  There is guarding  There is no rebound  Positive signs include McBurney's sign  Negative signs include Barrett's sign  Musculoskeletal:      Right lower leg: No edema  Left lower leg: No edema  Skin:     General: Skin is warm and dry  Capillary Refill: Capillary refill takes less than 2 seconds  Coloration: Skin is not pale  Findings: No erythema or rash  Neurological:      Mental Status: He is alert and oriented to person, place, and time  Motor: No abnormal muscle tone     Psychiatric:         Mood and Affect: Mood normal          Behavior: Behavior normal          Vital Signs  ED Triage Vitals   Temperature Pulse Respirations Blood Pressure SpO2   08/28/20 0105 08/28/20 0105 08/28/20 0105 08/28/20 0105 08/28/20 0105   (!) 97 3 °F (36 3 °C) 88 22 144/87 99 %      Temp Source Heart Rate Source Patient Position - Orthostatic VS BP Location FiO2 (%)   08/28/20 0319 08/28/20 0319 08/28/20 0319 08/28/20 0319 --   Oral Monitor Lying Right arm       Pain Score       08/28/20 0105       Worst Possible Pain           Vitals:    08/28/20 0105 08/28/20 0319   BP: 144/87 129/70   Pulse: 88 82   Patient Position - Orthostatic VS:  Lying         Visual Acuity      ED Medications  Medications   ondansetron (ZOFRAN) injection 4 mg (has no administration in time range)   ondansetron (ZOFRAN) injection 4 mg (4 mg Intravenous Given 8/28/20 0110)   sodium chloride 0 9 % bolus 1,000 mL (0 mL Intravenous Stopped 8/28/20 0255)   morphine (PF) 4 mg/mL injection 4 mg (4 mg Intravenous Given 8/28/20 0121)   iohexol (OMNIPAQUE) 350 MG/ML injection (MULTI-DOSE) 100 mL (100 mL Intravenous Given 8/28/20 0155)   ketorolac (TORADOL) injection 15 mg (15 mg Intravenous Given 8/28/20 0320)       Diagnostic Studies  Results Reviewed     Procedure Component Value Units Date/Time    Comprehensive metabolic panel [[id number]] Collected:  08/28/20 0109    Lab Status:  Final result Specimen:  Blood from Arm, Left Updated:  08/28/20 0129     Sodium 140 mmol/L      Potassium 3 5 mmol/L      Chloride 102 mmol/L      CO2 29 mmol/L      ANION GAP 9 mmol/L      BUN 19 mg/dL      Creatinine 1 10 mg/dL      Glucose 102 mg/dL      Calcium 9 4 mg/dL      AST 20 U/L      ALT 27 U/L      Alkaline Phosphatase 55 U/L      Total Protein 8 0 g/dL      Albumin 4 2 g/dL      Total Bilirubin 0 48 mg/dL      eGFR 93 ml/min/1 73sq m     Narrative:       National Kidney Disease Foundation guidelines for Chronic Kidney Disease (CKD):     Stage 1 with normal or high GFR (GFR > 90 mL/min/1 73 square meters)    Stage 2 Mild CKD (GFR = 60-89 mL/min/1 73 square meters)    Stage 3A Moderate CKD (GFR = 45-59 mL/min/1 73 square meters)    Stage 3B Moderate CKD (GFR = 30-44 mL/min/1 73 square meters)    Stage 4 Severe CKD (GFR = 15-29 mL/min/1 73 square meters)    Stage 5 End Stage CKD (GFR <15 mL/min/1 73 square meters)  Note: GFR calculation is accurate only with a steady state creatinine    Lipase [[id number]]  (Normal) Collected:  08/28/20 0109    Lab Status:  Final result Specimen:  Blood from Arm, Left Updated:  08/28/20 0129     Lipase 182 u/L     CBC and differential [[id number]]  (Abnormal) Collected:  08/28/20 0109    Lab Status:  Final result Specimen:  Blood from Arm, Left Updated:  08/28/20 0115     WBC 12 24 Thousand/uL      RBC 5 01 Million/uL      Hemoglobin 14 0 g/dL      Hematocrit 43 4 %      MCV 87 fL      MCH 27 9 pg      MCHC 32 3 g/dL      RDW 12 9 %      MPV 10 2 fL      Platelets 054 Thousands/uL      nRBC 0 /100 WBCs      Neutrophils Relative 76 %      Immat GRANS % 0 %      Lymphocytes Relative 16 %      Monocytes Relative 6 %      Eosinophils Relative 2 %      Basophils Relative 0 %      Neutrophils Absolute 9 21 Thousands/µL      Immature Grans Absolute 0 05 Thousand/uL Lymphocytes Absolute 1 98 Thousands/µL      Monocytes Absolute 0 77 Thousand/µL      Eosinophils Absolute 0 18 Thousand/µL      Basophils Absolute 0 05 Thousands/µL                  CT abdomen pelvis with contrast   Final Result by Collin Georges MD (08/28 0249)      Portions of the small bowel demonstrate abnormal bowel wall thickening and hyperemia  A few of these loops demonstrate tapering into abnormally dilated small bowel loops which demonstrate fecalization of small bowel contents, suggesting stasis  There    is also some bowel wall thickening, luminal narrowing, and hyperemia involving the midportion of the left colon  Crohn's disease should be considered  Gastroenterology consultation and follow-up is recommended  The appendix is not definitely identified  If there is concern for acute appendicitis, follow-up would be recommended  I personally discussed this study with Gurmeet Memrbeno on 8/28/2020 at 2:37 AM                      Workstation performed: [id number]                    Procedures  Procedures         ED Course       US AUDIT      Most Recent Value   Initial Alcohol Screen: US AUDIT-C    1  How often do you have a drink containing alcohol?  0 Filed at: 08/28/2020 0105   2  How many drinks containing alcohol do you have on a typical day you are drinking? 0 Filed at: 08/28/2020 0105   3a  Male UNDER 65: How often do you have five or more drinks on one occasion? 0 Filed at: 08/28/2020 0105   3b  FEMALE Any Age, or MALE 65+: How often do you have 4 or more drinks on one occassion? 0 Filed at: 08/28/2020 0105   Audit-C Score  0 Filed at: 08/28/2020 0105                  ARIANA/DAST-10      Most Recent Value   How many times in the past year have you    Used an illegal drug or used a prescription medication for non-medical reasons?   Never Filed at: 08/28/2020 0105                                MDM  Number of Diagnoses or Management Options  Abdominal pain: new and requires workup  Abnormal abdominal CT scan: new and requires workup  Colitis: new and requires workup  Diagnosis management comments: Patient is a 61-year-old male that presents for abdominal pain  He states that it started about 5 hours ago around the periumbilical and epigastric region  Now migrating to the right lower quadrant  Patient is very tender over McBurney's point  Has no other systemic symptoms other than nausea  Explained to the patient that it is early on to probably detect appendicitis on the CT scan plus he is very thin  I stated that given the migration though and the amount of tenderness that I do feel that a CT would be indicated for possible appendicitis  Stated that we will have him drink for the scanned to enhance it  He is okay with this and agrees with this plan of care  Will treat his symptoms with Zofran and morphine, give IV fluids and check labs  3:16 AM  Spoke with radiologist   Findings are concerning for Crohn's disease  Unable to see the appendix  Patient still has pain at this time  Since he has an elevated white count, unable to see the appendix, abnormal CT findings concerning for Crohn's disease I will admit him for serial abdominal exams and consultations with Gastroenterology and surgery         Amount and/or Complexity of Data Reviewed  Clinical lab tests: ordered and reviewed  Tests in the radiology section of CPT®: ordered and reviewed  Tests in the medicine section of CPT®: reviewed and ordered  Review and summarize past medical records: yes    Patient Progress  Patient progress: stable        Disposition  Final diagnoses:   Colitis   Abdominal pain   Abnormal abdominal CT scan     Time reflects when diagnosis was documented in both MDM as applicable and the Disposition within this note     Time User Action Codes Description Comment    8/28/2020  3:16 AM Dale Velasquez Add [K52 9] Colitis     8/28/2020  3:16 AM Martin Fierro Add [R10 9] Abdominal pain 8/28/2020  3:16 AM Martin Fierro Add [R93 5] Abnormal abdominal CT scan       ED Disposition     ED Disposition Condition Date/Time Comment    Admit Stable Fri Aug 28, 2020  3:16 AM Case was discussed with FAITH and the patient's admission status was agreed to be Admission Status: observation status to the service of Dr Erik Knapp          Follow-up Information    None         Patient's Medications   Discharge Prescriptions    No medications on file     No discharge procedures on file      PDMP Review     None          ED Provider  Electronically Signed by           Jose Mcknight DO  08/28/20 0335

## 2020-08-28 NOTE — PLAN OF CARE
Problem: PAIN - ADULT  Goal: Verbalizes/displays adequate comfort level or baseline comfort level  Description: Interventions:  - Encourage patient to monitor pain and request assistance  - Assess pain using appropriate pain scale  - Administer analgesics based on type and severity of pain and evaluate response  - Implement non-pharmacological measures as appropriate and evaluate response  - Consider cultural and social influences on pain and pain management  - Notify physician/advanced practitioner if interventions unsuccessful or patient reports new pain  Outcome: Progressing     Problem: INFECTION - ADULT  Goal: Absence or prevention of progression during hospitalization  Description: INTERVENTIONS:  - Assess and monitor for signs and symptoms of infection  - Monitor lab/diagnostic results  - Monitor all insertion sites, i e  indwelling lines, tubes, and drains  - Administer medications as ordered  - Instruct and encourage patient and family to use good hand hygiene technique    Outcome: Progressing     Problem: SAFETY ADULT  Goal: Patient will remain free of falls  Description: INTERVENTIONS:  - Assess patient frequently for physical needs  -  Identify cognitive and physical deficits and behaviors that affect risk of falls    -  [place] fall precautions as indicated by assessment   - Educate patient/family on patient safety including physical limitations  - Instruct patient to call for assistance with activity based on assessment  - Modify environment to reduce risk of injury    Outcome: Progressing     Problem: DISCHARGE PLANNING  Goal: Discharge to home or other facility with appropriate resources  Description: INTERVENTIONS:  - Identify barriers to discharge w/patient and caregiver  - Arrange for needed discharge resources and transportation as appropriate  - Identify discharge learning needs   - Refer to Case Management Department for coordinating discharge planning if the patient needs post-hospital services based on physician/advanced practitioner order or complex needs related to functional status, cognitive ability, or social support system  Outcome: Progressing     Problem: Knowledge Deficit  Goal: Patient/family/caregiver demonstrates understanding of disease process, treatment plan, medications, and discharge instructions  Description: Complete learning assessment and assess knowledge base  Interventions:  - Provide teaching at level of understanding  - Provide teaching via preferred learning methods  Outcome: Progressing     Problem: GASTROINTESTINAL - ADULT  Goal: Minimal or absence of nausea and/or vomiting  Description: INTERVENTIONS:  - Administer IV fluids if ordered to ensure adequate hydration  - Administer ordered antiemetic medications as needed  - Provide nonpharmacologic comfort measures as appropriate  - Advance diet as tolerated, if ordered  - Consider nutrition services referral to assist patient with adequate nutrition and appropriate food choices  Outcome: Progressing  Goal: Maintains adequate nutritional intake  Description: INTERVENTIONS:  - Monitor percentage of each meal consumed  - Identify factors contributing to decreased intake, treat as appropriate  - Assist with meals as needed  - Monitor I&O, weight, and lab values if indicated  - Obtain nutrition services referral as needed  -Consult with nutritionist   Outcome: Progressing     Problem: Potential for Falls  Goal: Patient will remain free of falls  Description: INTERVENTIONS:  - Assess patient frequently for physical needs  -  Identify cognitive and physical deficits and behaviors that affect risk of falls    -  [place] fall precautions as indicated by assessment   - Educate patient/family on patient safety including physical limitations  - Instruct patient to call for assistance with activity based on assessment  - Modify environment to reduce risk of injury    Outcome: Progressing     Problem: Nutrition/Hydration-ADULT  Goal: Nutrient/Hydration intake appropriate for improving, restoring or maintaining nutritional needs  Description: Monitor and assess patient's nutrition/hydration status for malnutrition  Collaborate with interdisciplinary team and initiate plan and interventions as ordered  Monitor patient's weight and dietary intake as ordered or per policy  Utilize nutrition screening tool and intervene as necessary  Determine patient's food preferences and provide high-protein, high-caloric foods as appropriate       INTERVENTIONS:  - Monitor oral intake, urinary output, labs, and treatment plans  - Assess nutrition and hydration status and recommend course of action  - Evaluate amount of meals eaten  - Assist patient with eating if necessary   - Allow adequate time for meals  - Recommend/ encourage appropriate diets, oral nutritional supplements, and vitamin/mineral supplements  - Order, calculate, and assess calorie counts as needed  - Recommend, monitor, and adjust tube feedings and TPN/PPN based on assessed needs  - Assess need for intravenous fluids  - Provide specific nutrition/hydration education as appropriate  - Include patient/family/caregiver in decisions related to nutrition  Outcome: Progressing

## 2020-08-28 NOTE — ED NOTES
Pt returned from [address] scan      Solange Badder, [place]  08/28/20 8098

## 2020-08-28 NOTE — ASSESSMENT & PLAN NOTE
Malnutrition Findings: BMI Findings: There is no height or weight on file to calculate BMI    BMI 17    · Denies recent weight loss or loss of appetite; states he struggles to gain weight  · Nutrition consult

## 2020-08-29 VITALS
DIASTOLIC BLOOD PRESSURE: 81 MMHG | WEIGHT: 132.5 LBS | HEART RATE: 65 BPM | HEIGHT: 72 IN | SYSTOLIC BLOOD PRESSURE: 131 MMHG | BODY MASS INDEX: 17.95 KG/M2 | OXYGEN SATURATION: 98 % | TEMPERATURE: 97.6 F | RESPIRATION RATE: 18 BRPM

## 2020-08-29 LAB
ANION GAP SERPL CALCULATED.3IONS-SCNC: 6 MMOL/L (ref 4–13)
BACTERIA UR QL AUTO: ABNORMAL /HPF
BASOPHILS # BLD AUTO: 0.03 THOUSANDS/ΜL (ref 0–0.1)
BASOPHILS NFR BLD AUTO: 1 % (ref 0–1)
BILIRUB UR QL STRIP: NEGATIVE
BUN SERPL-MCNC: 13 MG/DL (ref 5–25)
CALCIUM SERPL-MCNC: 8.5 MG/DL (ref 8.3–10.1)
CHLORIDE SERPL-SCNC: 107 MMOL/L (ref 100–108)
CLARITY UR: CLEAR
CO2 SERPL-SCNC: 27 MMOL/L (ref 21–32)
COLOR UR: YELLOW
CREAT SERPL-MCNC: 0.96 MG/DL (ref 0.6–1.3)
EOSINOPHIL # BLD AUTO: 0.2 THOUSAND/ΜL (ref 0–0.61)
EOSINOPHIL NFR BLD AUTO: 4 % (ref 0–6)
ERYTHROCYTE [DISTWIDTH] IN BLOOD BY AUTOMATED COUNT: 12.9 % (ref 11.6–15.1)
ERYTHROCYTE [SEDIMENTATION RATE] IN BLOOD: 1 MM/HOUR (ref 0–14)
GFR SERPL CREATININE-BSD FRML MDRD: 110 ML/MIN/1.73SQ M
GLUCOSE P FAST SERPL-MCNC: 78 MG/DL (ref 65–99)
GLUCOSE SERPL-MCNC: 78 MG/DL (ref 65–140)
GLUCOSE UR STRIP-MCNC: NEGATIVE MG/DL
HCT VFR BLD AUTO: 40.8 % (ref 36.5–49.3)
HGB BLD-MCNC: 12.6 G/DL (ref 12–17)
HGB UR QL STRIP.AUTO: NEGATIVE
IMM GRANULOCYTES # BLD AUTO: 0.02 THOUSAND/UL (ref 0–0.2)
IMM GRANULOCYTES NFR BLD AUTO: 0 % (ref 0–2)
KETONES UR STRIP-MCNC: NEGATIVE MG/DL
LEUKOCYTE ESTERASE UR QL STRIP: NEGATIVE
LYMPHOCYTES # BLD AUTO: 1.67 THOUSANDS/ΜL (ref 0.6–4.47)
LYMPHOCYTES NFR BLD AUTO: 34 % (ref 14–44)
MAGNESIUM SERPL-MCNC: 1.8 MG/DL (ref 1.6–2.6)
MCH RBC QN AUTO: 27.5 PG (ref 26.8–34.3)
MCHC RBC AUTO-ENTMCNC: 30.9 G/DL (ref 31.4–37.4)
MCV RBC AUTO: 89 FL (ref 82–98)
MONOCYTES # BLD AUTO: 0.54 THOUSAND/ΜL (ref 0.17–1.22)
MONOCYTES NFR BLD AUTO: 11 % (ref 4–12)
NEUTROPHILS # BLD AUTO: 2.39 THOUSANDS/ΜL (ref 1.85–7.62)
NEUTS SEG NFR BLD AUTO: 50 % (ref 43–75)
NITRITE UR QL STRIP: NEGATIVE
NON-SQ EPI CELLS URNS QL MICRO: ABNORMAL /HPF
NRBC BLD AUTO-RTO: 0 /100 WBCS
PH UR STRIP.AUTO: 6 [PH]
PLATELET # BLD AUTO: 132 THOUSANDS/UL (ref 149–390)
PMV BLD AUTO: 11.2 FL (ref 8.9–12.7)
POTASSIUM SERPL-SCNC: 3.9 MMOL/L (ref 3.5–5.3)
PROT UR STRIP-MCNC: NEGATIVE MG/DL
RBC # BLD AUTO: 4.59 MILLION/UL (ref 3.88–5.62)
RBC #/AREA URNS AUTO: ABNORMAL /HPF
SODIUM SERPL-SCNC: 140 MMOL/L (ref 136–145)
SP GR UR STRIP.AUTO: 1.01 (ref 1–1.03)
UROBILINOGEN UR QL STRIP.AUTO: 0.2 E.U./DL
WBC # BLD AUTO: 4.85 THOUSAND/UL (ref 4.31–10.16)
WBC #/AREA URNS AUTO: ABNORMAL /HPF

## 2020-08-29 PROCEDURE — 81001 URINALYSIS AUTO W/SCOPE: CPT | Performed by: HOSPITALIST

## 2020-08-29 PROCEDURE — 83993 ASSAY FOR CALPROTECTIN FECAL: CPT | Performed by: NURSE PRACTITIONER

## 2020-08-29 PROCEDURE — 99217 PR OBSERVATION CARE DISCHARGE MANAGEMENT: CPT | Performed by: HOSPITALIST

## 2020-08-29 PROCEDURE — 85652 RBC SED RATE AUTOMATED: CPT | Performed by: HOSPITALIST

## 2020-08-29 PROCEDURE — 85025 COMPLETE CBC W/AUTO DIFF WBC: CPT | Performed by: NURSE PRACTITIONER

## 2020-08-29 PROCEDURE — 87045 FECES CULTURE AEROBIC BACT: CPT | Performed by: HOSPITALIST

## 2020-08-29 PROCEDURE — 80048 BASIC METABOLIC PNL TOTAL CA: CPT | Performed by: NURSE PRACTITIONER

## 2020-08-29 PROCEDURE — 83735 ASSAY OF MAGNESIUM: CPT | Performed by: HOSPITALIST

## 2020-08-29 RX ORDER — DICYCLOMINE HYDROCHLORIDE 10 MG/1
10 CAPSULE ORAL EVERY 6 HOURS PRN
Qty: 30 CAPSULE | Refills: 0 | Status: SHIPPED | OUTPATIENT
Start: 2020-08-29 | End: 2020-10-01 | Stop reason: SDUPTHER

## 2020-08-29 RX ADMIN — SODIUM CHLORIDE 100 ML/HR: 0.9 INJECTION, SOLUTION INTRAVENOUS at 01:00

## 2020-08-29 RX ADMIN — DICYCLOMINE HYDROCHLORIDE 10 MG: 10 CAPSULE ORAL at 08:30

## 2020-08-29 NOTE — UTILIZATION REVIEW
Continued Stay Review    Date: 8/29/20                        Current Patient Class: IP Current Level of Care: MS    HPI:25 y o  male initially admitted on 8/28 with sudden onset of abd pain and found to have colitis with possible Crohn's  Assessment/Plan:     Pt was comfortable overnight        Pertinent Labs/Diagnostic Results:       Results from last 7 days   Lab Units 08/29/20  0516 08/28/20  0109   WBC Thousand/uL 4 85 12 24*   HEMOGLOBIN g/dL 12 6 14 0   HEMATOCRIT % 40 8 43 4   PLATELETS Thousands/uL 132* 162   NEUTROS ABS Thousands/µL 2 39 9 21*         Results from last 7 days   Lab Units 08/29/20  0516 08/28/20  0109   SODIUM mmol/L 140 140   POTASSIUM mmol/L 3 9 3 5   CHLORIDE mmol/L 107 102   CO2 mmol/L 27 29   ANION GAP mmol/L 6 9   BUN mg/dL 13 19   CREATININE mg/dL 0 96 1 10   EGFR ml/min/1 73sq m 110 93   CALCIUM mg/dL 8 5 9 4   MAGNESIUM mg/dL 1 8  --      Results from last 7 days   Lab Units 08/28/20  0109   AST U/L 20   ALT U/L 27   ALK PHOS U/L 55   TOTAL PROTEIN g/dL 8 0   ALBUMIN g/dL 4 2   TOTAL BILIRUBIN mg/dL 0 48         Results from last 7 days   Lab Units 08/29/20  0516 08/28/20  0109   GLUCOSE RANDOM mg/dL 78 102     Results from last 7 days   Lab Units 08/28/20  0109   LIPASE u/L 182     Results from last 7 days   Lab Units 08/29/20  0517 08/28/20  0109   CRP mg/L  --  <3 0   SED RATE mm/hour 1  --      Vital Signs:   08/29/20 0700   97 6 °F (36 4 °C)   65   18   131/81   --   --   Lying    08/28/20 2300   98 2 °F (36 8 °C)   72   20   121/73   98 %   None (Room air)   Lying    08/28/20 2100   --   --   --   --   --   None (Room air)   --    08/28/20 1710   97 5 °F (36 4 °C)   65   18   113/59   99 %   None (Room air)   Lying    08/28/20 0700   97 5 °F (36 4 °C)   86   18   100/54   99 %   None (Room air)   Lying    08/28/20 0418   98 5 °F (36 9 °C)   71   20   133/80   97 %   None (Room air)   Lying    08/28/20 0319   97 6 °F (36 4 °C)   82   22   129/70   98 %   None (Room air) Lying    08/28/20 0105   97 3 °F (36 3 °C)Abnormal     88   22   144/87   99 %   None (Room air)   --      Medications:   Scheduled Medications:  dicyclomine, 10 mg, Oral, 4x Daily (AC & HS)      Continuous IV Infusions:  sodium chloride, 100 mL/hr, Intravenous, Continuous      PRN Meds:  acetaminophen, 650 mg, Oral, Q6H PRN  aluminum-magnesium hydroxide-simethicone, 30 mL, Oral, Q6H PRN  ketorolac, 30 mg, Intravenous, Q6H PRN - x 1 8/28  morphine injection, 2 mg, Intravenous, Q4H PRN  ondansetron, 4 mg, Intravenous, Q6H PRN    Discharge Plan: D    Network Utilization Review Department  [email or web address]  ATTENTION: Please call with any questions or concerns to [phone] and carefully listen to the prompts so that you are directed to the right person  All voicemails are confidential   Jennifer Abraham all requests for admission clinical reviews, approved or denied determinations and any other requests to dedicated fax number below belonging to the campus where the patient is receiving treatment   List of dedicated fax numbers for the Facilities:  [address] [place] [address] DENIALS (Administrative/Medical Necessity) [phone]   [address] (Maternity/NICU/Pediatrics) [phone]   Dajuan Lang [phone]   Zan Daniel [phone]   [facility] [phone]   [facility] [phone]   [address] [place] [address] [address] [facility] [phone]   [facility]  [phone]   [address] [facility]  [address] [address] [address] [facility] [phone]

## 2020-08-29 NOTE — PLAN OF CARE
Problem: PAIN - ADULT  Goal: Verbalizes/displays adequate comfort level or baseline comfort level  Description: Interventions:  - Encourage patient to monitor pain and request assistance  - Assess pain using appropriate pain scale  - Administer analgesics based on type and severity of pain and evaluate response  - Implement non-pharmacological measures as appropriate and evaluate response  - Consider cultural and social influences on pain and pain management  - Notify physician/advanced practitioner if interventions unsuccessful or patient reports new pain  Outcome: Adequate for Discharge     Problem: INFECTION - ADULT  Goal: Absence or prevention of progression during hospitalization  Description: INTERVENTIONS:  - Assess and monitor for signs and symptoms of infection  - Monitor lab/diagnostic results  - Monitor all insertion sites, i e  indwelling lines, tubes, and drains  - Administer medications as ordered  - Instruct and encourage patient and family to use good hand hygiene technique    Outcome: Adequate for Discharge     Problem: SAFETY ADULT  Goal: Patient will remain free of falls  Description: INTERVENTIONS:  - Assess patient frequently for physical needs  -  Identify cognitive and physical deficits and behaviors that affect risk of falls    -  [place] fall precautions as indicated by assessment   - Educate patient/family on patient safety including physical limitations  - Instruct patient to call for assistance with activity based on assessment  - Modify environment to reduce risk of injury    Outcome: Adequate for Discharge     Problem: DISCHARGE PLANNING  Goal: Discharge to home or other facility with appropriate resources  Description: INTERVENTIONS:  - Identify barriers to discharge w/patient and caregiver  - Arrange for needed discharge resources and transportation as appropriate  - Identify discharge learning needs   - Refer to Case Management Department for coordinating discharge planning if the patient needs post-hospital services based on physician/advanced practitioner order or complex needs related to functional status, cognitive ability, or social support system  Outcome: Adequate for Discharge     Problem: Knowledge Deficit  Goal: Patient/family/caregiver demonstrates understanding of disease process, treatment plan, medications, and discharge instructions  Description: Complete learning assessment and assess knowledge base  Interventions:  - Provide teaching at level of understanding  - Provide teaching via preferred learning methods  Outcome: Adequate for Discharge     Problem: GASTROINTESTINAL - ADULT  Goal: Minimal or absence of nausea and/or vomiting  Description: INTERVENTIONS:  - Administer IV fluids if ordered to ensure adequate hydration  - Administer ordered antiemetic medications as needed  - Provide nonpharmacologic comfort measures as appropriate  - Advance diet as tolerated, if ordered  - Consider nutrition services referral to assist patient with adequate nutrition and appropriate food choices  Outcome: Adequate for Discharge  Goal: Maintains adequate nutritional intake  Description: INTERVENTIONS:  - Monitor percentage of each meal consumed  - Identify factors contributing to decreased intake, treat as appropriate  - Assist with meals as needed  - Monitor I&O, weight, and lab values if indicated  - Obtain nutrition services referral as needed  -Consult with nutritionist   Outcome: Adequate for Discharge     Problem: Potential for Falls  Goal: Patient will remain free of falls  Description: INTERVENTIONS:  - Assess patient frequently for physical needs  -  Identify cognitive and physical deficits and behaviors that affect risk of falls    -  [place] fall precautions as indicated by assessment   - Educate patient/family on patient safety including physical limitations  - Instruct patient to call for assistance with activity based on assessment  - Modify environment to reduce risk of injury    Outcome: Adequate for Discharge     Problem: Nutrition/Hydration-ADULT  Goal: Nutrient/Hydration intake appropriate for improving, restoring or maintaining nutritional needs  Description: Monitor and assess patient's nutrition/hydration status for malnutrition  Collaborate with interdisciplinary team and initiate plan and interventions as ordered  Monitor patient's weight and dietary intake as ordered or per policy  Utilize nutrition screening tool and intervene as necessary  Determine patient's food preferences and provide high-protein, high-caloric foods as appropriate       INTERVENTIONS:  - Monitor oral intake, urinary output, labs, and treatment plans  - Assess nutrition and hydration status and recommend course of action  - Evaluate amount of meals eaten  - Assist patient with eating if necessary   - Allow adequate time for meals  - Recommend/ encourage appropriate diets, oral nutritional supplements, and vitamin/mineral supplements  - Order, calculate, and assess calorie counts as needed  - Recommend, monitor, and adjust tube feedings and TPN/PPN based on assessed needs  - Assess need for intravenous fluids  - Provide specific nutrition/hydration education as appropriate  - Include patient/family/caregiver in decisions related to nutrition  Outcome: Adequate for Discharge

## 2020-08-29 NOTE — DISCHARGE SUMMARY
Discharge- Jamie Colvin 1995, 26 y o  male MRN: [id number]    Unit/Bed#: E2 -01 Encounter: [id number]    Primary Care Provider: Edu Mills PA-C   Date and time admitted to hospital: 8/28/2020  1:03 AM        * Colitis  Assessment & Plan  Has had at least 3 episodes of colitis since he was a teenager  Concerning for Crohns disease  GI and I spoke to him about the importance of following up as an ouptatient  He needs a colonoscopy in the near future  He is having no symptoms now  Ok to go home with prn Bentyl        Discharging Physician / Practitioner: Lizeth Morris DO  PCP: Edu Mills PA-C  Admission Date:   Admission Orders (From admission, onward)     Ordered        08/28/20 0335  Place in Observation (expected length of stay for this patient is less than two midnights)  Once                   Discharge Date: 08/29/20    Resolved Problems  Date Reviewed: 8/29/2020    None            Consultations During Hospital Stay:  · GI      Procedures Performed:     · CT abdomen      Reason for Admission: abdominal pain      Hospital Course:     Jamie Colvin is a 26 y o  male patient who originally presented to the hospital on 8/28/2020 due to abdominal pain  He was found to have colitis  It is concerning for Crohns disease  Patient has had several episodes of colitis in the last 10 years, but has never followed up with GI as an outpatient  He has never had a colonoscopy  Patient quickly improved  We will send him home and GI is going to set up an outpatient appointment  We stressed the importance of following up with them  Please see above list of diagnoses and related plan for additional information  Condition at Discharge: fair       Discharge Day Visit / Exam:     Subjective:  Feels well  No abdominal pain  No diarrhea        Vitals: Blood Pressure: 131/81 (08/29/20 0700)  Pulse: 65 (08/29/20 0700)  Temperature: 97 6 °F (36 4 °C) (08/29/20 0700)  Temp Source: Temporal (08/29/20 0700)  Respirations: 18 (08/29/20 0700)  Height: 6' (182 9 cm) (08/28/20 1057)  Weight - Scale: 60 1 kg (132 lb 7 9 oz) (08/28/20 1057)  SpO2: 98 % (08/28/20 2300)    Exam:     Physical Exam  Vitals signs and nursing note reviewed  HENT:      Head: Normocephalic and atraumatic  Eyes:      Pupils: Pupils are equal, round, and reactive to light  Cardiovascular:      Rate and Rhythm: Normal rate and regular rhythm  Heart sounds: No murmur  No friction rub  No gallop  Pulmonary:      Effort: Pulmonary effort is normal       Breath sounds: Normal breath sounds  No wheezing or rales  Abdominal:      General: Bowel sounds are normal       Palpations: Abdomen is soft  Tenderness: There is no abdominal tenderness  Santee Organ Discharge instructions/Information to patient and family:   See after visit summary for information provided to patient and family  Provisions for Follow-Up Care:  See after visit summary for information related to follow-up care and any pertinent home health orders  Disposition:     Home       Discharge Statement:  I spent 20 minutes discharging the patient  This time was spent on the day of discharge  I had direct contact with the patient on the day of discharge  Greater than 50% of the total time was spent examining patient, answering all patient questions, arranging and discussing plan of care with patient as well as directly providing post-discharge instructions  Additional time then spent on discharge activities  Discharge Medications:  See after visit summary for reconciled discharge medications provided to patient and family        ** Please Note: This note has been constructed using a voice recognition system **

## 2020-08-31 ENCOUNTER — TRANSITIONAL CARE MANAGEMENT (OUTPATIENT)
Dept: FAMILY MEDICINE CLINIC | Facility: CLINIC | Age: 25
End: 2020-08-31

## 2020-08-31 ENCOUNTER — TELEPHONE (OUTPATIENT)
Dept: GASTROENTEROLOGY | Facility: MEDICAL CENTER | Age: 25
End: 2020-08-31

## 2020-08-31 DIAGNOSIS — Z71.89 COMPLEX CARE COORDINATION: Primary | ICD-10-CM

## 2020-08-31 NOTE — TELEPHONE ENCOUNTER
----- Message from Verenice Landeros MD sent at 8/29/2020 10:29 AM EDT -----  I am not sure if anyone messaged you guys about this patient as I am just covering for the weekend  Patient is being discharged today for possible Crohn's colitis  He needs EGD/colonoscopy within 1-2 weeks as outpatient  And then he needs follow-up any time after his EGD/colonoscopy to discuss his results in person  GAYLE Saenz    Gastroenterology Fellow PGY-4  8/29/2020 10:30 AM

## 2020-09-01 ENCOUNTER — PATIENT OUTREACH (OUTPATIENT)
Dept: FAMILY MEDICINE CLINIC | Facility: CLINIC | Age: 25
End: 2020-09-01

## 2020-09-01 NOTE — PROGRESS NOTES
Outpatient Care Management Note:  RE: Followed up with pt who reports that he is planning on scheduling his PCP and GI appts today post hospital discharge  He declines assist in doing so and any outreach at this time  Pt is very pleasant and appreciative of the call and concern  We did discuss typical process with GI prep and colonoscopy and he denies any anxiety towards this  He assures this CM that he will schedule f/u today

## 2020-09-08 LAB
CALPROTECTIN STL-MCNT: 36 UG/G (ref 0–120)
MISCELLANEOUS LAB TEST RESULT: NORMAL

## 2020-10-01 ENCOUNTER — OFFICE VISIT (OUTPATIENT)
Dept: FAMILY MEDICINE CLINIC | Facility: CLINIC | Age: 25
End: 2020-10-01
Payer: COMMERCIAL

## 2020-10-01 ENCOUNTER — LAB (OUTPATIENT)
Dept: LAB | Facility: CLINIC | Age: 25
End: 2020-10-01
Payer: COMMERCIAL

## 2020-10-01 VITALS
HEART RATE: 76 BPM | SYSTOLIC BLOOD PRESSURE: 120 MMHG | HEIGHT: 73 IN | BODY MASS INDEX: 18.64 KG/M2 | WEIGHT: 140.6 LBS | TEMPERATURE: 98.4 F | DIASTOLIC BLOOD PRESSURE: 60 MMHG

## 2020-10-01 DIAGNOSIS — E55.9 VITAMIN D DEFICIENCY: Primary | ICD-10-CM

## 2020-10-01 DIAGNOSIS — J30.9 ALLERGIC RHINITIS, UNSPECIFIED SEASONALITY, UNSPECIFIED TRIGGER: ICD-10-CM

## 2020-10-01 DIAGNOSIS — K52.9 COLITIS: ICD-10-CM

## 2020-10-01 DIAGNOSIS — K52.9 COLITIS: Primary | ICD-10-CM

## 2020-10-01 DIAGNOSIS — Z23 ENCOUNTER FOR IMMUNIZATION: ICD-10-CM

## 2020-10-01 DIAGNOSIS — E44.0 MODERATE PROTEIN-CALORIE MALNUTRITION (HCC): Chronic | ICD-10-CM

## 2020-10-01 DIAGNOSIS — Z00.00 HEALTH CARE MAINTENANCE: ICD-10-CM

## 2020-10-01 DIAGNOSIS — E04.0 DIFFUSE NONTOXIC GOITER: ICD-10-CM

## 2020-10-01 DIAGNOSIS — Z13.220 SCREENING FOR LIPID DISORDERS: ICD-10-CM

## 2020-10-01 DIAGNOSIS — Z11.4 SCREENING FOR HIV (HUMAN IMMUNODEFICIENCY VIRUS): ICD-10-CM

## 2020-10-01 DIAGNOSIS — R73.9 HYPERGLYCEMIA: ICD-10-CM

## 2020-10-01 DIAGNOSIS — Z83.3 FAMILY HISTORY OF DIABETES MELLITUS: ICD-10-CM

## 2020-10-01 DIAGNOSIS — E55.9 VITAMIN D DEFICIENCY: ICD-10-CM

## 2020-10-01 DIAGNOSIS — M08.3 POLYARTICULAR JUVENILE RHEUMATOID ARTHRITIS, CHRONIC (HCC): ICD-10-CM

## 2020-10-01 DIAGNOSIS — Z23 NEED FOR INFLUENZA VACCINATION: ICD-10-CM

## 2020-10-01 DIAGNOSIS — E44.0 MODERATE PROTEIN-CALORIE MALNUTRITION (HCC): ICD-10-CM

## 2020-10-01 PROBLEM — D72.829 LEUKOCYTOSIS: Status: RESOLVED | Noted: 2020-08-28 | Resolved: 2020-10-01

## 2020-10-01 PROBLEM — J01.90 ACUTE NON-RECURRENT SINUSITIS: Status: RESOLVED | Noted: 2018-02-12 | Resolved: 2020-10-01

## 2020-10-01 LAB
25(OH)D3 SERPL-MCNC: 24.7 NG/ML (ref 30–100)
ALBUMIN SERPL BCP-MCNC: 4.3 G/DL (ref 3.5–5)
ALP SERPL-CCNC: 61 U/L (ref 46–116)
ALT SERPL W P-5'-P-CCNC: 21 U/L (ref 12–78)
ANION GAP SERPL CALCULATED.3IONS-SCNC: 4 MMOL/L (ref 4–13)
AST SERPL W P-5'-P-CCNC: 17 U/L (ref 5–45)
BILIRUB SERPL-MCNC: 0.47 MG/DL (ref 0.2–1)
BILIRUB UR QL STRIP: NEGATIVE
BUN SERPL-MCNC: 19 MG/DL (ref 5–25)
CALCIUM SERPL-MCNC: 9.2 MG/DL (ref 8.3–10.1)
CHLORIDE SERPL-SCNC: 106 MMOL/L (ref 100–108)
CHOLEST SERPL-MCNC: 169 MG/DL (ref 50–200)
CLARITY UR: CLEAR
CO2 SERPL-SCNC: 29 MMOL/L (ref 21–32)
COLOR UR: YELLOW
CREAT SERPL-MCNC: 0.99 MG/DL (ref 0.6–1.3)
ERYTHROCYTE [DISTWIDTH] IN BLOOD BY AUTOMATED COUNT: 13.5 % (ref 11.6–15.1)
EST. AVERAGE GLUCOSE BLD GHB EST-MCNC: 105 MG/DL
GFR SERPL CREATININE-BSD FRML MDRD: 106 ML/MIN/1.73SQ M
GLUCOSE P FAST SERPL-MCNC: 91 MG/DL (ref 65–99)
GLUCOSE UR STRIP-MCNC: NEGATIVE MG/DL
HBA1C MFR BLD: 5.3 %
HCT VFR BLD AUTO: 44.6 % (ref 36.5–49.3)
HDLC SERPL-MCNC: 67 MG/DL
HGB BLD-MCNC: 14.7 G/DL (ref 12–17)
HGB UR QL STRIP.AUTO: NEGATIVE
KETONES UR STRIP-MCNC: NEGATIVE MG/DL
LDLC SERPL CALC-MCNC: 96 MG/DL (ref 0–100)
LEUKOCYTE ESTERASE UR QL STRIP: NEGATIVE
MCH RBC QN AUTO: 28.4 PG (ref 26.8–34.3)
MCHC RBC AUTO-ENTMCNC: 33 G/DL (ref 31.4–37.4)
MCV RBC AUTO: 86 FL (ref 82–98)
NITRITE UR QL STRIP: NEGATIVE
PH UR STRIP.AUTO: 6.5 [PH]
PLATELET # BLD AUTO: 183 THOUSANDS/UL (ref 149–390)
PMV BLD AUTO: 10.8 FL (ref 8.9–12.7)
POTASSIUM SERPL-SCNC: 4.4 MMOL/L (ref 3.5–5.3)
PROT SERPL-MCNC: 7.6 G/DL (ref 6.4–8.2)
PROT UR STRIP-MCNC: NEGATIVE MG/DL
RBC # BLD AUTO: 5.17 MILLION/UL (ref 3.88–5.62)
SODIUM SERPL-SCNC: 139 MMOL/L (ref 136–145)
SP GR UR STRIP.AUTO: 1.01 (ref 1–1.03)
T4 SERPL-MCNC: 8.8 UG/DL (ref 4.7–13.3)
TRIGL SERPL-MCNC: 31 MG/DL
TSH SERPL DL<=0.05 MIU/L-ACNC: 1 UIU/ML (ref 0.36–3.74)
UROBILINOGEN UR QL STRIP.AUTO: 0.2 E.U./DL
WBC # BLD AUTO: 4.56 THOUSAND/UL (ref 4.31–10.16)

## 2020-10-01 PROCEDURE — 36415 COLL VENOUS BLD VENIPUNCTURE: CPT

## 2020-10-01 PROCEDURE — 80061 LIPID PANEL: CPT

## 2020-10-01 PROCEDURE — 85027 COMPLETE CBC AUTOMATED: CPT

## 2020-10-01 PROCEDURE — 99214 OFFICE O/P EST MOD 30 MIN: CPT | Performed by: FAMILY MEDICINE

## 2020-10-01 PROCEDURE — 84436 ASSAY OF TOTAL THYROXINE: CPT

## 2020-10-01 PROCEDURE — 86376 MICROSOMAL ANTIBODY EACH: CPT

## 2020-10-01 PROCEDURE — 87389 HIV-1 AG W/HIV-1&-2 AB AG IA: CPT

## 2020-10-01 PROCEDURE — 83036 HEMOGLOBIN GLYCOSYLATED A1C: CPT

## 2020-10-01 PROCEDURE — 86800 THYROGLOBULIN ANTIBODY: CPT

## 2020-10-01 PROCEDURE — 81003 URINALYSIS AUTO W/O SCOPE: CPT

## 2020-10-01 PROCEDURE — 84443 ASSAY THYROID STIM HORMONE: CPT

## 2020-10-01 PROCEDURE — 80053 COMPREHEN METABOLIC PANEL: CPT

## 2020-10-01 PROCEDURE — 82306 VITAMIN D 25 HYDROXY: CPT

## 2020-10-01 RX ORDER — DICYCLOMINE HYDROCHLORIDE 10 MG/1
10 CAPSULE ORAL EVERY 6 HOURS PRN
Qty: 40 CAPSULE | Refills: 1 | Status: SHIPPED | OUTPATIENT
Start: 2020-10-01

## 2020-10-02 ENCOUNTER — HOSPITAL ENCOUNTER (OUTPATIENT)
Dept: RADIOLOGY | Facility: HOSPITAL | Age: 25
Discharge: HOME | End: 2020-10-02
Payer: COMMERCIAL

## 2020-10-02 DIAGNOSIS — E04.0 DIFFUSE NONTOXIC GOITER: ICD-10-CM

## 2020-10-02 LAB
HIV 1+2 AB+HIV1 P24 AG SERPL QL IA: NORMAL
THYROGLOB AB SERPL-ACNC: <1 IU/ML (ref 0–0.9)
THYROPEROXIDASE AB SERPL-ACNC: <9 IU/ML (ref 0–34)

## 2020-10-02 PROCEDURE — 76536 US EXAM OF HEAD AND NECK: CPT

## 2020-12-08 ENCOUNTER — OFFICE VISIT (OUTPATIENT)
Dept: RHEUMATOLOGY | Facility: CLINIC | Age: 25
End: 2020-12-08
Payer: COMMERCIAL

## 2020-12-08 VITALS — HEART RATE: 78 BPM | WEIGHT: 140.65 LBS | HEIGHT: 73 IN | BODY MASS INDEX: 18.64 KG/M2

## 2020-12-08 DIAGNOSIS — M08.3 POLYARTICULAR JUVENILE RHEUMATOID ARTHRITIS, CHRONIC (HCC): Primary | ICD-10-CM

## 2020-12-08 DIAGNOSIS — M54.50 CHRONIC BILATERAL LOW BACK PAIN WITHOUT SCIATICA: ICD-10-CM

## 2020-12-08 DIAGNOSIS — R19.8 ABNORMAL BOWEL HABITS: ICD-10-CM

## 2020-12-08 DIAGNOSIS — G89.29 CHRONIC BILATERAL LOW BACK PAIN WITHOUT SCIATICA: ICD-10-CM

## 2020-12-08 DIAGNOSIS — Z15.89 HLA-B27 POSITIVE: ICD-10-CM

## 2020-12-08 PROCEDURE — 99214 OFFICE O/P EST MOD 30 MIN: CPT | Performed by: INTERNAL MEDICINE

## 2021-02-12 ENCOUNTER — OFFICE VISIT (OUTPATIENT)
Dept: GASTROENTEROLOGY | Facility: MEDICAL CENTER | Age: 26
End: 2021-02-12
Payer: COMMERCIAL

## 2021-02-12 VITALS
SYSTOLIC BLOOD PRESSURE: 112 MMHG | WEIGHT: 145 LBS | BODY MASS INDEX: 19.22 KG/M2 | HEART RATE: 80 BPM | DIASTOLIC BLOOD PRESSURE: 70 MMHG | HEIGHT: 73 IN

## 2021-02-12 DIAGNOSIS — R10.9 ABDOMINAL PAIN, UNSPECIFIED ABDOMINAL LOCATION: Primary | ICD-10-CM

## 2021-02-12 DIAGNOSIS — R63.4 WEIGHT LOSS: ICD-10-CM

## 2021-02-12 DIAGNOSIS — K52.9 COLITIS: ICD-10-CM

## 2021-02-12 DIAGNOSIS — R63.0 POOR APPETITE: ICD-10-CM

## 2021-02-12 PROCEDURE — 99204 OFFICE O/P NEW MOD 45 MIN: CPT | Performed by: PHYSICIAN ASSISTANT

## 2021-02-12 NOTE — PROGRESS NOTES
[facility] - Outpatient Follow-up Note  Seven Quiroz 26 y o  male MRN: [id number]  Encounter: [id number]          ASSESSMENT AND PLAN:      1  Colitis  2  Abdominal pain, unspecified abdominal location  3  Poor appetite  4  Weight loss: he is here as an ED follow up for colitis  He has had multiple episodes of abdominal pain and diarrhea since age 13  There are 2 confirmed CTs showing inflammation  In 2016 there was thickened inflamed appearing wall of descending colon most compatible with infectious or inflammatory colitis  Most recent CT 8/20 with portions of the small bowel demonstrated abnormal bowel wall thickening and hyperemia  There were a few loops tapering into abnormally dilated small bowel loops suggestive of stasis  There was also bowel wall thickening, luminal narrowing and hyperemia involving the midportion of the left colon concerning for IBD  Today he feels well and is without symptoms  He denies any melena or hematochezia during his episodes of abdominal pain  He has never had egd or colonoscopy in the past  Will do routine blood work, inflammatory marker as well as EGD/colon to rule out IBD  If negative, would consider MR enterography for further evaluation   - Ambulatory referral to Gastroenterology  - CBC and differential  - Comprehensive metabolic panel  - Iron Panel (Includes Ferritin, Iron Sat%, Iron, and TIBC); Future  - Vitamin D 25 hydroxy; Future  - C-reactive protein; Future  - Calprotectin,Fecal  - Colonoscopy; Future  - EGD; Future  -f/u after EGD/colonoscopy       ______________________________________________________________________    SUBJECTIVE:  Stalin Chu is a 25 yo male with no significant pmh here for ER follow up of colitis  He presented to the ED 8/28/20 with abdominal pain and diarrhea  CT scan revealed colitis with portions of the small bowel demonstrated abnormal bowel wall thickening and hyperemia   There were a few loops tapering into abnormally dilated small bowel loops suggestive of stasis  There was also bowel wall thickening, luminal narrowing and hyperemia involving the midportion of the left colon concerning for IBD  Concerning for a crohns disease  He states this has happened multiple times since he was aged 13 and he presents tot he ED and is sent home after being told he has colitis  He admits to poor appetite and weight loss during these episodes  He denies any melena or hematochezia  He has never had an egd or colonoscopy  He is a smoker, but states this is rare  He socially drinks alcohol during the holidays  Denies past abdominal surgeries  REVIEW OF SYSTEMS IS OTHERWISE NEGATIVE  Historical Information   Past Medical History:   Diagnosis Date    Allergic rhinitis     [id number] RESOLVED    Blepharitis     [id number]  UNSPECIFIED LATERALITY   [id number] RESOLVED    Rheumatoid arthritis ([address] )     Viral gastroenteritis     [id number] RESOLVED     Past Surgical History:   Procedure Laterality Date    HERNIA REPAIR Bilateral     Infant     Social History   Social History     Substance and Sexual Activity   Alcohol Use Yes    Frequency: Monthly or less    Drinks per session: 1 or 2    Binge frequency: Never     Social History     Substance and Sexual Activity   Drug Use Yes    Frequency: 2 0 times per week    Types: Marijuana    Comment: weekly     Social History     Tobacco Use   Smoking Status Current Some Day Smoker   Smokeless Tobacco Never Used     Family History   Problem Relation Age of Onset    Anxiety disorder Mother     Seizures Mother     Meniere's disease Father     Seizures Brother     Asperger's syndrome Brother        Meds/Allergies       Current Outpatient Medications:     Cholecalciferol (Vitamin D-3) 125 MCG (5000 UT) TABS    dicyclomine (BENTYL) 10 mg capsule    No Known Allergies        Objective     Blood pressure 112/70, pulse 80, height 6' 1" (1 854 m), weight 65 8 kg (145 lb)   Body mass index is 19 13 kg/m²  PHYSICAL EXAM:      General Appearance:   Alert, cooperative, no distress   HEENT:   Normocephalic, atraumatic, anicteric      Neck:  Supple, symmetrical, trachea midline   Lungs:   Clear to auscultation bilaterally; no rales, rhonchi or wheezing; respirations unlabored    Heart[de-identified]   Regular rate and rhythm; no murmur, rub, or gallop  Abdomen:   Soft, non-tender, non-distended; normal bowel sounds; no masses, no organomegaly    Genitalia:   Deferred    Rectal:   Deferred    Extremities:  No cyanosis, clubbing or edema    Pulses:  2+ and symmetric    Skin:  No jaundice, rashes, or lesions    Lymph nodes:  No palpable cervical lymphadenopathy        Lab Results:   No visits with results within 1 Day(s) from this visit     Latest known visit with results is:   Lab on 10/01/2020   Component Date Value    HIV-1/HIV-2 Ab 10/01/2020 Non-Reactive     WBC 10/01/2020 4 56     RBC 10/01/2020 5 17     Hemoglobin 10/01/2020 14 7     Hematocrit 10/01/2020 44 6     MCV 10/01/2020 86     MCH 10/01/2020 28 4     MCHC 10/01/2020 33 0     RDW 10/01/2020 13 5     Platelets 52/69/9985 183     MPV 10/01/2020 10 8     Sodium 10/01/2020 139     Potassium 10/01/2020 4 4     Chloride 10/01/2020 106     CO2 10/01/2020 29     ANION GAP 10/01/2020 4     BUN 10/01/2020 19     Creatinine 10/01/2020 0 99     Glucose, Fasting 10/01/2020 91     Calcium 10/01/2020 9 2     AST 10/01/2020 17     ALT 10/01/2020 21     Alkaline Phosphatase 10/01/2020 61     Total Protein 10/01/2020 7 6     Albumin 10/01/2020 4 3     Total Bilirubin 10/01/2020 0 47     eGFR 10/01/2020 106     Hemoglobin A1C 10/01/2020 5 3     EAG 10/01/2020 105     Cholesterol 10/01/2020 169     Triglycerides 10/01/2020 31     HDL, Direct 10/01/2020 67     LDL Calculated 10/01/2020 96     T4 TOTAL 10/01/2020 8 8     TSH 3RD GENERATON 10/01/2020 1 000     Color, UA 10/01/2020 Yellow     Clarity, UA 10/01/2020 Clear     Specific Gravity, UA 10/01/2020 1 013     pH, UA 10/01/2020 6 5     Leukocytes, UA 10/01/2020 Negative     Nitrite, UA 10/01/2020 Negative     Protein, UA 10/01/2020 Negative     Glucose, UA 10/01/2020 Negative     Ketones, UA 10/01/2020 Negative     Urobilinogen, UA 10/01/2020 0 2     Bilirubin, UA 10/01/2020 Negative     Blood, UA 10/01/2020 Negative     Vit D, 25-Hydroxy 10/01/2020 24 7*    THYROID MICROSOMAL ANTIB* 10/01/2020 <9     Thyroglobulin Ab 10/01/2020 <1 0          Radiology Results:   No results found

## 2021-02-12 NOTE — PATIENT INSTRUCTIONS
The patient is scheduled at [facility]   for a colon/egd  with Dr Mello  on 03/15/2021  Dulcolax/Miralax prep instructions have been gone over in the office, with the patient, by the MA  The patient is aware that they will receive a call with the arrival time the day prior to procedure  The patient is aware they will need a ride to and from the procedure

## 2021-02-15 ENCOUNTER — ANESTHESIA EVENT (OUTPATIENT)
Dept: GASTROENTEROLOGY | Facility: MEDICAL CENTER | Age: 26
End: 2021-02-15

## 2021-03-15 ENCOUNTER — HOSPITAL ENCOUNTER (OUTPATIENT)
Dept: GASTROENTEROLOGY | Facility: MEDICAL CENTER | Age: 26
Setting detail: OUTPATIENT SURGERY
Discharge: HOME | End: 2021-03-15
Admitting: ANESTHESIOLOGY
Payer: COMMERCIAL

## 2021-03-15 ENCOUNTER — ANESTHESIA (OUTPATIENT)
Dept: GASTROENTEROLOGY | Facility: MEDICAL CENTER | Age: 26
End: 2021-03-15

## 2021-03-15 VITALS
TEMPERATURE: 97.9 F | DIASTOLIC BLOOD PRESSURE: 60 MMHG | RESPIRATION RATE: 18 BRPM | BODY MASS INDEX: 18.96 KG/M2 | WEIGHT: 140 LBS | HEIGHT: 72 IN | OXYGEN SATURATION: 95 % | SYSTOLIC BLOOD PRESSURE: 103 MMHG | HEART RATE: 69 BPM

## 2021-03-15 DIAGNOSIS — K52.9 COLITIS: ICD-10-CM

## 2021-03-15 PROBLEM — F17.200 SMOKER: Status: ACTIVE | Noted: 2019-03-07

## 2021-03-15 PROCEDURE — 43239 EGD BIOPSY SINGLE/MULTIPLE: CPT | Performed by: INTERNAL MEDICINE

## 2021-03-15 PROCEDURE — 88305 TISSUE EXAM BY PATHOLOGIST: CPT | Performed by: PATHOLOGY

## 2021-03-15 PROCEDURE — 45380 COLONOSCOPY AND BIOPSY: CPT | Performed by: INTERNAL MEDICINE

## 2021-03-15 RX ORDER — SODIUM CHLORIDE 9 MG/ML
125 INJECTION, SOLUTION INTRAVENOUS CONTINUOUS
Status: DISCONTINUED | OUTPATIENT
Start: 2021-03-15 | End: 2021-03-19 | Stop reason: HOSPADM

## 2021-03-15 RX ORDER — PROPOFOL 10 MG/ML
INJECTION, EMULSION INTRAVENOUS AS NEEDED
Status: DISCONTINUED | OUTPATIENT
Start: 2021-03-15 | End: 2021-03-15

## 2021-03-15 RX ADMIN — SODIUM CHLORIDE 125 ML/HR: 0.9 INJECTION, SOLUTION INTRAVENOUS at 08:49

## 2021-03-15 RX ADMIN — PROPOFOL 200 MG: 10 INJECTION, EMULSION INTRAVENOUS at 09:27

## 2021-03-15 RX ADMIN — PROPOFOL 100 MG: 10 INJECTION, EMULSION INTRAVENOUS at 09:37

## 2021-03-15 RX ADMIN — PROPOFOL 100 MG: 10 INJECTION, EMULSION INTRAVENOUS at 09:29

## 2021-03-15 NOTE — ANESTHESIA POSTPROCEDURE EVALUATION
Post-Op Assessment Note    CV Status:  Stable  Pain Score: 1    Pain management: adequate     Mental Status:  Alert and awake   Hydration Status:  Euvolemic   PONV Controlled:  Controlled   Airway Patency:  Patent      Post Op Vitals Reviewed: Yes      Staff: Anesthesiologist         No complications documented      /52 (03/15/21 0952)    Temp      Pulse 71 (03/15/21 0952)   Resp 15 (03/15/21 0952)    SpO2 92 % (03/15/21 0952)

## 2021-03-15 NOTE — H&P
History and Physical -  [facility]  Ivett Aguilar [place] 26 y o  male MRN: [id number]                  HPI: Rodríguez Mathews is a 26y o  year old male who presents for change in bowel habits      REVIEW OF SYSTEMS: Per the HPI, and otherwise unremarkable  Historical Information   Past Medical History:   Diagnosis Date    Allergic rhinitis     [id number] RESOLVED    Arthritis     Blepharitis     [id number]  UNSPECIFIED LATERALITY   [id number] RESOLVED    Colitis     Rheumatoid arthritis ([address] )     Viral gastroenteritis     [id number] RESOLVED     Past Surgical History:   Procedure Laterality Date    HERNIA REPAIR Bilateral     Infant     Social History   Social History     Substance and Sexual Activity   Alcohol Use Yes    Frequency: Monthly or less    Drinks per session: 1 or 2    Binge frequency: Never     Social History     Substance and Sexual Activity   Drug Use Yes    Frequency: 2 0 times per week    Types: Marijuana    Comment: weekly     Social History     Tobacco Use   Smoking Status Current Some Day Smoker   Smokeless Tobacco Never Used     Family History   Problem Relation Age of Onset    Anxiety disorder Mother     Seizures Mother     Meniere's disease Father     Seizures Brother     Asperger's syndrome Brother        Meds/Allergies       Current Outpatient Medications:     Cholecalciferol (Vitamin D-3) 125 MCG (5000 UT) TABS    dicyclomine (BENTYL) 10 mg capsule    Current Facility-Administered Medications:     sodium chloride 0 9 % infusion, 125 mL/hr, Intravenous, Continuous    No Known Allergies    Objective     There were no vitals taken for this visit  PHYSICAL EXAM    Gen: NAD  CV: RRR  CHEST: Clear  ABD: soft, NT/ND  EXT: no edema      ASSESSMENT/PLAN:  This is a 26y o  year old male here for colonoscopy endoscopy evaluation and he is stable and optimized for his procedure

## 2021-03-15 NOTE — DISCHARGE INSTRUCTIONS
Upper Endoscopy   WHAT YOU NEED TO KNOW:   An upper endoscopy is also called an upper gastrointestinal (GI) endoscopy, or an esophagogastroduodenoscopy (EGD)  You may feel bloated, gassy, or have some abdominal discomfort after your procedure  Your throat may be sore for 24 to 36 hours  You may burp or pass gas from air that is still inside your body  DISCHARGE INSTRUCTIONS:   Call 911 for any of the following:   · You have sudden chest pain or trouble breathing  Seek care immediately if:   · You feel dizzy or faint  · You have trouble swallowing  · Your bowel movements are very dark or black  · Your abdomen is hard and firm and you have severe pain  · You vomit blood  Contact your healthcare provider if:   · You feel full or bloated and cannot burp or pass gas  · You have not had a bowel movement for 3 days after your procedure  · You have neck pain  · You have a fever or chills  · You have nausea or are vomiting  · You have a rash or hives  · You have questions or concerns about your endoscopy  Relieve a sore throat:  Suck on throat lozenges or crushed ice  Gargle with a small amount of warm salt water  Mix 1 teaspoon of salt and 1 cup of warm water to make salt water  Relieve gas and discomfort from bloating:  Lie on your right side with a heating pad on your abdomen  Take short walks to help pass gas  Eat small meals until bloating is relieved  Rest after your procedure: You have been given medicine to relax you  Do not  drive or make important decisions until the day after your procedure  Return to your normal activity as directed  You can usually return to work the day after your procedure  Follow up with your healthcare provider as directed:  Write down your questions so you remember to ask them during your visits  © 2017 [employer] is for End User's use only and may not be sold, redistributed or otherwise used for commercial purposes  All illustrations and images included in CareNotes® are the copyrighted property of [employer]  or Michael Puente  The above information is an  only  It is not intended as medical advice for individual conditions or treatments  Talk to your doctor, nurse or pharmacist before following any medical regimen to see if it is safe and effective for you  Colonoscopy   WHAT YOU NEED TO KNOW:   A colonoscopy is a procedure to examine the inside of your colon (intestine) with a scope  Polyps or tissue growths may have been removed during your colonoscopy  It is normal to feel bloated and to have some abdominal discomfort  You should be passing gas  If you have hemorrhoids or you had polyps removed, you may have a small amount of bleeding  DISCHARGE INSTRUCTIONS:   Seek care immediately if:   · You have a large amount of bright red blood in your bowel movements  · Your abdomen is hard and firm and you have severe pain  · You have sudden trouble breathing  Contact your healthcare provider if:   · You develop a rash or hives  · You have a fever within 24 hours of your procedure       · You have not had a bowel movement for 3 days after your procedure  · You have questions or concerns about your condition or care  Activity:   · Do not lift, strain, or run  for 3 days after your procedure  · Rest after your procedure  You have been given medicine to relax you  Do not  drive or make important decisions until the day after your procedure  Return to your normal activity as directed  · Relieve gas and discomfort from bloating  by lying on your right side with a heating pad on your abdomen  You may need to take short walks to help the gas move out  Eat small meals until bloating is relieved  If you had polyps removed: For 7 days after your procedure:  · Do not  take aspirin  · Do not  go on long car rides    Follow up with your healthcare provider as directed:  Write down your questions so you remember to ask them during your visits  © 2017 [employer] is for End User's use only and may not be sold, redistributed or otherwise used for commercial purposes  All illustrations and images included in CareNotes® are the copyrighted property of [employer]  or Michael Puente  The above information is an  only  It is not intended as medical advice for individual conditions or treatments  Talk to your doctor, nurse or pharmacist before following any medical regimen to see if it is safe and effective for you

## 2021-03-15 NOTE — ANESTHESIA PREPROCEDURE EVALUATION
Procedure:  COLONOSCOPY  EGD    Relevant Problems   MUSCULOSKELETAL   (+) Lumbar scoliosis   (+) Polyarticular juvenile rheumatoid arthritis, chronic (HCC)      PULMONARY   (+) Smoker        Physical Exam    Airway    Mallampati score: III  TM Distance: <3 FB  Neck ROM: full     Dental       Cardiovascular  Rhythm: regular, Rate: normal,     Pulmonary  Breath sounds clear to auscultation,     Other Findings        Anesthesia Plan  ASA Score- 2     Anesthesia Type-     Plan Factors-Exercise tolerance (METS): >4 METS  Chart reviewed  Existing labs reviewed  Patient summary reviewed  Patient is not a current smoker  Patient not instructed to abstain from smoking on day of procedure  Patient did not smoke on day of surgery  Obstructive sleep apnea risk education given perioperatively  Induction- intravenous  Postoperative Plan-     Informed Consent- Anesthetic plan and risks discussed with patient

## 2021-09-17 ENCOUNTER — OFFICE VISIT (OUTPATIENT)
Dept: FAMILY MEDICINE CLINIC | Facility: CLINIC | Age: 26
End: 2021-09-17
Payer: COMMERCIAL

## 2021-09-17 VITALS
BODY MASS INDEX: 18.83 KG/M2 | HEART RATE: 72 BPM | RESPIRATION RATE: 16 BRPM | DIASTOLIC BLOOD PRESSURE: 72 MMHG | HEIGHT: 72 IN | SYSTOLIC BLOOD PRESSURE: 110 MMHG | WEIGHT: 139 LBS

## 2021-09-17 DIAGNOSIS — B35.1 ONYCHOMYCOSIS OF RIGHT GREAT TOE: ICD-10-CM

## 2021-09-17 DIAGNOSIS — B35.1 ONYCHOMYCOSIS OF LEFT GREAT TOE: Primary | ICD-10-CM

## 2021-09-17 PROCEDURE — 99213 OFFICE O/P EST LOW 20 MIN: CPT | Performed by: NURSE PRACTITIONER

## 2021-09-17 NOTE — PROGRESS NOTES
Assessment and Plan:    Problem List Items Addressed This Visit        Musculoskeletal and Integument    Onychomycosis of left great toe - Primary     Penlac ordered to be used HS for the next 12 weeks  Patient advised that if this does not improve his symptoms the next step would be beginning p o  Lamisil  Relevant Medications    ciclopirox (PENLAC) 8 % solution    Onychomycosis of right great toe     Penlac ordered to be used HS for the next 12 weeks  Patient advised that if this does not improve his symptoms the next step would be beginning p o  Lamisil  Relevant Medications    ciclopirox (PENLAC) 8 % solution                 Diagnoses and all orders for this visit:    Onychomycosis of left great toe  -     ciclopirox (PENLAC) 8 % solution; Apply topically daily at bedtime    Onychomycosis of right great toe  -     ciclopirox (PENLAC) 8 % solution; Apply topically daily at bedtime              Subjective:      Patient ID: Mena Reilly is a 23 y o  male  CC:    Chief Complaint   Patient presents with    Nail Problem     pt here with toe nail discoloration x 1 week  DELICIA bolden       HPI:    Nail discoloration: Patient has noted onchomycosis of his right and left great toenail  Patient denies any pain, change in the nail, or drainage from these areas  The following portions of the patient's history were reviewed and updated as appropriate: allergies, current medications, past family history, past medical history, past social history, past surgical history and problem list       Review of Systems   Constitutional: Negative for chills and fever  HENT: Negative for ear pain and sore throat  Eyes: Negative for pain and visual disturbance  Respiratory: Negative for cough, chest tightness, shortness of breath and wheezing  Cardiovascular: Negative for chest pain, palpitations and leg swelling  Gastrointestinal: Negative for abdominal pain, constipation, diarrhea, nausea and vomiting  Endocrine: Negative for cold intolerance and heat intolerance  Genitourinary: Negative for decreased urine volume, dysuria and hematuria  Musculoskeletal: Negative for arthralgias, back pain and myalgias  Skin: Negative for color change and rash  Allergic/Immunologic: Negative for environmental allergies  Neurological: Negative for dizziness, seizures, syncope, weakness, light-headedness, numbness and headaches  Hematological: Negative for adenopathy  Psychiatric/Behavioral: Negative for confusion  The patient is not nervous/anxious  All other systems reviewed and are negative  Data to review:       Objective:    Vitals:    09/17/21 1559   BP: 110/72   BP Location: Left arm   Patient Position: Sitting   Cuff Size: Standard   Pulse: 72   Resp: 16   Weight: 63 kg (139 lb)   Height: 6' (1 829 m)        Physical Exam  Vitals and nursing note reviewed  Constitutional:       General: He is not in acute distress  Appearance: Normal appearance  He is well-developed  He is not ill-appearing  HENT:      Head: Normocephalic and atraumatic  Eyes:      Conjunctiva/sclera: Conjunctivae normal    Cardiovascular:      Rate and Rhythm: Normal rate and regular rhythm  Pulses: Normal pulses  Carotid pulses are 2+ on the right side and 2+ on the left side  Posterior tibial pulses are 2+ on the right side and 2+ on the left side  Heart sounds: Normal heart sounds  No murmur heard  Pulmonary:      Effort: Pulmonary effort is normal  No respiratory distress  Breath sounds: Normal breath sounds  No wheezing or rhonchi  Abdominal:      General: Abdomen is flat  Bowel sounds are normal  There is no distension  Palpations: Abdomen is soft  Tenderness: There is no abdominal tenderness  There is no guarding  Musculoskeletal:         General: Normal range of motion  Cervical back: Normal range of motion and neck supple  Right lower leg: No edema  Left lower leg: No edema  Feet:      Right foot:      Toenail Condition: Fungal disease present  Left foot:      Toenail Condition: Fungal disease present  Comments: Onchomycosis of left and right great toenails noted  No other abnormalities noted in the toenails  Skin:     General: Skin is warm and dry  Capillary Refill: Capillary refill takes less than 2 seconds  Neurological:      General: No focal deficit present  Mental Status: He is alert and oriented to person, place, and time  Psychiatric:         Mood and Affect: Mood normal          Behavior: Behavior normal          Thought Content: Thought content normal          Judgment: Judgment normal              Depression Screening and Follow-up Plan:   Patient was screened for depression during today's encounter  They screened negative with a PHQ-2 score of 0  Tobacco Cessation Counseling: Tobacco cessation counseling was provided   The patient is sincerely urged to quit consumption of tobacco  He is not ready to quit tobacco

## 2021-09-17 NOTE — ASSESSMENT & PLAN NOTE
Penlac ordered to be used HS for the next 12 weeks  Patient advised that if this does not improve his symptoms the next step would be beginning p o  Lamisil

## 2021-09-17 NOTE — PATIENT INSTRUCTIONS
Nail Fungus   AMBULATORY CARE:   Nail fungus , or onychomycosis, is a fungal infection in your toenail or fingernail  Nail fungus is more common in toenails than fingernails  The cause of the infection may not be known  Common symptoms include the following:   · Nails that curl up or down or are misshapen    · Discolored (often white, yellow, or brown) nails    · Fragile or cracked nails    · Thick nails or nails with rough, jagged edges    · Nail that is  from the nail bed    · Tenderness or pain in the affected nail    Contact your healthcare provider if:  You have questions or concerns about your condition or care  Treatment for nail fungus  may include antifungal medicine and topical treatments  Antifungal medicine is a pill that treats a fungal infection  You may need to take this medicine for up to 12 weeks  Topical treatments include creams and polishes that you apply to the top of your nail  You may need to use topical treatments for up to 1 year before you see positive results  Ask your healthcare provider for more information about antifungal medicine  Manage your symptoms:   · Use antifungal sprays or powders  You can buy these at your local drugstore  · Keep your nails short  and file down any thick areas  Use separate nail trimmers and files for infected nails and healthy nails  If you go to a salon to get your nails done, bring your own nail files and trimmers  Prevent nail fungus:   · Dry your feet with a towel  or hair dryer after you bathe  · Do not wear tight-fitting shoes  or shoes that pinch your toes  Avoid shoes made from rubber or plastic  · Wear socks that absorb moisture  This includes socks make of wool, nylon, or polypropylene  Do not wear cotton socks  Change your socks if they are damp from sweat or your feet get wet  Put on dry, clean socks every day  · Do not go barefoot  in locker rooms or public showers      · Do not use nail polish  or artificial nails such as acrylic or gel nails  · Wear gloves that are waterproof  if you work with water  Follow up with your healthcare provider as directed:  Write down your questions so you remember to ask them during your visits  © Copyright [employer] 2021 Information is for End User's use only and may not be sold, redistributed or otherwise used for commercial purposes  All illustrations and images included in CareNotes® are the copyrighted property of [employer]  or [employer]  The above information is an  only  It is not intended as medical advice for individual conditions or treatments  Talk to your doctor, nurse or pharmacist before following any medical regimen to see if it is safe and effective for you

## 2022-01-09 ENCOUNTER — OFFICE VISIT (OUTPATIENT)
Dept: URGENT CARE | Facility: CLINIC | Age: 27
End: 2022-01-09
Payer: COMMERCIAL

## 2022-01-09 VITALS
TEMPERATURE: 98.2 F | BODY MASS INDEX: 18.28 KG/M2 | HEIGHT: 72 IN | RESPIRATION RATE: 18 BRPM | HEART RATE: 82 BPM | OXYGEN SATURATION: 98 % | WEIGHT: 135 LBS

## 2022-01-09 DIAGNOSIS — Z11.59 ENCOUNTER FOR SCREENING FOR VIRAL DISEASE: Primary | ICD-10-CM

## 2022-01-09 PROCEDURE — 99213 OFFICE O/P EST LOW 20 MIN: CPT | Performed by: PREVENTIVE MEDICINE

## 2022-01-09 PROCEDURE — U0005 INFEC AGEN DETEC AMPLI PROBE: HCPCS | Performed by: PREVENTIVE MEDICINE

## 2022-01-09 PROCEDURE — U0003 INFECTIOUS AGENT DETECTION BY NUCLEIC ACID (DNA OR RNA); SEVERE ACUTE RESPIRATORY SYNDROME CORONAVIRUS 2 (SARS-COV-2) (CORONAVIRUS DISEASE [COVID-19]), AMPLIFIED PROBE TECHNIQUE, MAKING USE OF HIGH THROUGHPUT TECHNOLOGIES AS DESCRIBED BY CMS-2020-01-R: HCPCS | Performed by: PREVENTIVE MEDICINE

## 2022-01-09 NOTE — PATIENT INSTRUCTIONS
You must quarantine until the results of the COVID screen or back    If you are positive discuss the results with your family doctor

## 2022-01-09 NOTE — PROGRESS NOTES
[facility]        NAME: Yudy Urena is a 23 y o  male  : 1995    MRN: [id number]  DATE: 2022  TIME: 1:38 PM    Assessment and Plan   No primary diagnosis found  No diagnosis found  Patient Instructions       Follow up with PCP in 3-5 days  Proceed to  ER if symptoms worsen  Chief Complaint     Chief Complaint   Patient presents with    Fever     Tmax = 100 0 last night  Alternating tylenol and ibuprofen  Took Sudafed this am     Cold Like Symptoms     began 2 days ago with body aches    Generalized Body Aches         History of Present Illness       Cough congestion fever headache and body aches x2 days  Note on vaccinated  Review of Systems   Review of Systems   Constitutional: Positive for fatigue and fever  HENT: Positive for congestion  Respiratory: Positive for cough  Negative for shortness of breath  Neurological: Positive for headaches           Current Medications       Current Outpatient Medications:     Cholecalciferol (Vitamin D-3) 125 MCG (5000 UT) TABS, Take 1 tablet by mouth daily, Disp: , Rfl:     ciclopirox (PENLAC) 8 % solution, Apply topically daily at bedtime, Disp: 6 6 mL, Rfl: 0    dicyclomine (BENTYL) 10 mg capsule, Take 1 capsule (10 mg total) by mouth every 6 (six) hours as needed (abdominal cramping), Disp: 40 capsule, Rfl: 1    Current Allergies     Allergies as of 2022    (No Known Allergies)            The following portions of the patient's history were reviewed and updated as appropriate: allergies, current medications, past family history, past medical history, past social history, past surgical history and problem list      Past Medical History:   Diagnosis Date    Allergic rhinitis     [id number] RESOLVED    Arthritis     Blepharitis     [id number]  UNSPECIFIED LATERALITY   [id number] RESOLVED    Colitis     Rheumatoid arthritis ([address] )     Viral gastroenteritis     [id number] RESOLVED       Past Surgical History: Procedure Laterality Date    HERNIA REPAIR Bilateral     Infant       Family History   Problem Relation Age of Onset    Anxiety disorder Mother     Seizures Mother     Meniere's disease Father     Seizures Brother     Asperger's syndrome Brother          Medications have been verified  Objective   Pulse 82   Temp 98 2 °F (36 8 °C)   Resp 18   Ht 6' (1 829 m)   Wt 61 2 kg (135 lb)   SpO2 98%   BMI 18 31 kg/m²   No LMP for male patient  Physical Exam     Physical Exam  Vitals reviewed  Constitutional:       General: He is not in acute distress  Appearance: Normal appearance  He is diaphoretic  He is not ill-appearing or toxic-appearing

## 2022-01-10 LAB — SARS-COV-2 RNA RESP QL NAA+PROBE: POSITIVE

## 2022-09-29 ENCOUNTER — TELEMEDICINE (OUTPATIENT)
Dept: FAMILY MEDICINE CLINIC | Facility: CLINIC | Age: 27
End: 2022-09-29
Payer: COMMERCIAL

## 2022-09-29 VITALS — RESPIRATION RATE: 6 BRPM | BODY MASS INDEX: 18.31 KG/M2 | WEIGHT: 135 LBS | TEMPERATURE: 97.1 F

## 2022-09-29 DIAGNOSIS — Z20.822 EXPOSURE TO CONFIRMED CASE OF COVID-19: ICD-10-CM

## 2022-09-29 DIAGNOSIS — R05.9 COUGH: ICD-10-CM

## 2022-09-29 DIAGNOSIS — R09.81 HEAD CONGESTION: Primary | ICD-10-CM

## 2022-09-29 DIAGNOSIS — M79.10 MYALGIA: ICD-10-CM

## 2022-09-29 PROCEDURE — U0005 INFEC AGEN DETEC AMPLI PROBE: HCPCS | Performed by: FAMILY MEDICINE

## 2022-09-29 PROCEDURE — 99213 OFFICE O/P EST LOW 20 MIN: CPT | Performed by: FAMILY MEDICINE

## 2022-09-29 PROCEDURE — U0003 INFECTIOUS AGENT DETECTION BY NUCLEIC ACID (DNA OR RNA); SEVERE ACUTE RESPIRATORY SYNDROME CORONAVIRUS 2 (SARS-COV-2) (CORONAVIRUS DISEASE [COVID-19]), AMPLIFIED PROBE TECHNIQUE, MAKING USE OF HIGH THROUGHPUT TECHNOLOGIES AS DESCRIBED BY CMS-2020-01-R: HCPCS | Performed by: FAMILY MEDICINE

## 2022-09-29 NOTE — PROGRESS NOTES
COVID-19 Outpatient Progress Note    Assessment/Plan:   1  Head congestion  2  Cough  3  Per myalgia  4  Exposure to [place] test negative patient will come to office for full PCR test  Patient is on vaccinated   Patient use Tylenol Mucinex DM for symptomatic relief   Return in 1 week if still symptoms, if symptoms worsen call office report to ER    Problem List Items Addressed This Visit    None     Visit Diagnoses     Head congestion    -  Primary    Relevant Orders    COVID Only - Office Collect    Cough        Relevant Orders    COVID Only - Office Collect    Myalgia        Relevant Orders    COVID Only - Office Collect    Exposure to confirmed case of COVID-19        Relevant Orders    COVID Only - Office Collect         Disposition:     Recommended patient to come to the office to test for COVID-19  While awaiting the results of covid testing, patient was advised to isolate  Patient, and to office for PCR testing he will call from the parking lot and we will swab and sent to lab  I have spent 15 minutes directly with the patient  Encounter provider: Olivia Denton DO     Provider located at: [address] [address] [phone]     Recent Visits  No visits were found meeting these conditions  Showing recent visits within past 7 days and meeting all other requirements  Today's Visits  Date Type Provider Dept   09/29/22 Telemedicine Olivia Denton DO [facility]   Showing today's visits and meeting all other requirements  Future Appointments  No visits were found meeting these conditions  Showing future appointments within next 150 days and meeting all other requirements     This virtual check-in was done via telephone and he agrees to proceed      Patient agrees to participate in a virtual check in via telephone or video visit instead of presenting to the office to address urgent/immediate medical needs  Patient is aware this is a billable service  He acknowledged consent and understanding of privacy and security of the video platform  The patient has agreed to participate and understands they can discontinue the visit at any time  After connecting through Telephone, the patient was identified by name and date of birth  Harry Dewitt was informed that this was a telemedicine visit and that the exam was being conducted confidentially over secure lines  My office door was closed  No one else was in the room  Harry Dewitt acknowledged consent and understanding of privacy and security of the telemedicine visit  I informed the patient that I have reviewed his record in Epic and presented the opportunity for him to ask any questions regarding the visit today  The patient agreed to participate  It was my intent to perform this visit via video technology but the patient was not able to do a video connection so the visit was completed via audio telephone only  Verification of patient location:  Patient is located in the following state in which I hold an active license: [place]    Subjective:   Harry Dewitt is a 33 y o  male who is concerned about COVID-19  Patient's symptoms include nasal congestion, cough and myalgias  Patient denies fever, chills, fatigue, malaise, rhinorrhea, sore throat, anosmia, loss of taste, shortness of breath, chest tightness, abdominal pain, nausea, vomiting, diarrhea and headaches       - Date of symptom onset: 9/28/2022  - Date of exposure: 9/27/2022      COVID-19 vaccination status: Not vaccinated    Exposure:   Contact with a person who is under investigation (PUI) for or who is positive for COVID-19 within the last 14 days?: Yes    Hospitalized recently for fever and/or lower respiratory symptoms?: No      Currently a healthcare worker that is involved in direct patient care?: No      Works in a special setting where the risk of COVID-19 transmission may be high? (this may include long-term care, correctional and [facility] facilities; homeless shelters; assisted-living facilities and group homes ): No      Resident in a special setting where the risk of COVID-19 transmission may be high? (this may include long-term care, correctional and [facility] facilities; homeless shelters; assisted-living facilities and group homes ): No      Patient states last night he started with a mild cough nasal congestion myalgia  Patient is not vaccinated  Patient stated he was with a friend 2 days ago that subsequently tested positive for COVID  Patient did do home test earlier today which was negative    Lab Results   Component Value Date    SARSCOV2 Positive (A) 01/09/2022       Review of Systems   Constitutional: Negative for chills, fatigue and fever  HENT: Positive for congestion  Negative for rhinorrhea and sore throat  Respiratory: Positive for cough  Negative for chest tightness and shortness of breath  Gastrointestinal: Negative for abdominal pain, diarrhea, nausea and vomiting  Musculoskeletal: Positive for myalgias  Neurological: Negative for headaches       Current Outpatient Medications on File Prior to Visit   Medication Sig    Cholecalciferol (Vitamin D-3) 125 MCG (5000 UT) TABS Take 1 tablet by mouth daily    ciclopirox (PENLAC) 8 % solution Apply topically daily at bedtime    dicyclomine (BENTYL) 10 mg capsule Take 1 capsule (10 mg total) by mouth every 6 (six) hours as needed (abdominal cramping)       Objective:    Temp (!) 97 1 °F (36 2 °C)   Resp (!) 6   Wt 61 2 kg (135 lb)   BMI 18 31 kg/m²      Physical Exam  Constitutional:       Comments: Telephone only, patient unable to use am [employer], DO

## 2022-09-29 NOTE — PATIENT INSTRUCTIONS
Patient come to office for PCR COVID test   Patient use Tylenol Mucinex DM  Return in 1 week if still symptoms   If symptoms worsen please call office report to ER

## 2022-09-29 NOTE — PROGRESS NOTES
Assessment and Plan:    Problem List Items Addressed This Visit    None     Visit Diagnoses     Head congestion    -  Primary    Relevant Orders    COVID Only - Office Collect    Cough        Relevant Orders    COVID Only - Office Collect    Myalgia        Relevant Orders    COVID Only - Office Collect    Exposure to confirmed case of COVID-19        Relevant Orders    COVID Only - Office Collect                 [id number]          Subjective:      Patient ID: Danita Mcneill is a 33 y o  male      CC:    Chief Complaint   Patient presents with    Cold Like Symptoms     Pt c/o sore,throat no fever,nasal congestion,cough symptoms started last evening    COVID-19       HPI:    HPI    {Common ambulatory SmartLinks:50868}      Review of Systems      Data to review:       Objective:    Vitals:    09/29/22 0939   Resp: (!) 6   Temp: (!) 97 1 °F (36 2 °C)   Weight: 61 2 kg (135 lb)        Physical Exam

## 2022-09-30 LAB — SARS-COV-2 RNA RESP QL NAA+PROBE: NEGATIVE

## 2022-12-29 ENCOUNTER — OFFICE VISIT (OUTPATIENT)
Dept: URGENT CARE | Facility: CLINIC | Age: 27
End: 2022-12-29

## 2022-12-29 VITALS
HEART RATE: 80 BPM | OXYGEN SATURATION: 95 % | RESPIRATION RATE: 16 BRPM | SYSTOLIC BLOOD PRESSURE: 135 MMHG | TEMPERATURE: 97.5 F | DIASTOLIC BLOOD PRESSURE: 82 MMHG

## 2022-12-29 DIAGNOSIS — B34.9 VIRAL SYNDROME: Primary | ICD-10-CM

## 2022-12-29 NOTE — PROGRESS NOTES
[facility]        NAME: Mike Torrez is a 33 y o  male  : 1995    MRN: [id number]  DATE: 2022  TIME: 3:04 PM    Assessment and Plan   Viral syndrome [B34 9]  1  Viral syndrome              Patient Instructions       Follow up with PCP as needed  OTC meds  InCrease fluids  I explained to patient that this is viral and Z-Victor Hugo's do not help for bacterial infection  Chief Complaint     Chief Complaint   Patient presents with   • Cough     Pt reports cough for 2-3 days with yellow, green, and brown mucus  No home COVID tests  Declined COVID test in office  History of Present Illness       Patient with 2-day history of congestion, postnasal drip and cough  Requests a [facility]  URI   This is a new problem  The problem has been unchanged  There has been no fever  Associated symptoms include congestion, coughing and rhinorrhea  Pertinent negatives include no abdominal pain, chest pain, diarrhea, dysuria, ear pain, headaches, joint pain, joint swelling, nausea, neck pain, plugged ear sensation, rash, sinus pain, sneezing, sore throat, swollen glands, vomiting or wheezing  Review of Systems   Review of Systems   HENT: Positive for congestion and rhinorrhea  Negative for ear pain, sinus pain, sneezing and sore throat  Respiratory: Positive for cough  Negative for wheezing  Cardiovascular: Negative for chest pain  Gastrointestinal: Negative for abdominal pain, diarrhea, nausea and vomiting  Genitourinary: Negative for dysuria  Musculoskeletal: Negative for joint pain and neck pain  Skin: Negative for rash  Neurological: Negative for headaches  All other systems reviewed and are negative          Current Medications       Current Outpatient Medications:   •  Cholecalciferol (Vitamin D-3) 125 MCG (5000 UT) TABS, Take 1 tablet by mouth daily, Disp: , Rfl:   •  ciclopirox (PENLAC) 8 % solution, Apply topically daily at bedtime (Patient not taking: Reported on 12/29/2022), Disp: 6 6 mL, Rfl: 0  •  dicyclomine (BENTYL) 10 mg capsule, Take 1 capsule (10 mg total) by mouth every 6 (six) hours as needed (abdominal cramping) (Patient not taking: Reported on 12/29/2022), Disp: 40 capsule, Rfl: 1    Current Allergies     Allergies as of 12/29/2022   • (No Known Allergies)            The following portions of the patient's history were reviewed and updated as appropriate: allergies, current medications, past family history, past medical history, past social history, past surgical history and problem list      Past Medical History:   Diagnosis Date   • Allergic rhinitis     [id number] RESOLVED   • Arthritis    • Blepharitis     [id number]  UNSPECIFIED LATERALITY   [id number] RESOLVED   • Colitis    • Rheumatoid arthritis ([facility] [address] )    • Viral gastroenteritis     [id number] RESOLVED       Past Surgical History:   Procedure Laterality Date   • HERNIA REPAIR Bilateral     Infant       Family History   Problem Relation Age of Onset   • Anxiety disorder Mother    • Seizures Mother    • Meniere's disease Father    • Seizures Brother    • Asperger's syndrome Brother          Medications have been verified  Objective   /82   Pulse 80   Temp 97 5 °F (36 4 °C)   Resp 16   SpO2 95%   No LMP for male patient  Physical Exam     Physical Exam  Vitals and nursing note reviewed  Constitutional:       Appearance: Normal appearance  He is normal weight  HENT:      Right Ear: Tympanic membrane, ear canal and external ear normal       Left Ear: Tympanic membrane, ear canal and external ear normal       Nose: Congestion and rhinorrhea present  Mouth/Throat:      Mouth: Mucous membranes are moist       Pharynx: Posterior oropharyngeal erythema present  No oropharyngeal exudate  Eyes:      Conjunctiva/sclera: Conjunctivae normal    Cardiovascular:      Rate and Rhythm: Normal rate and regular rhythm  Pulses: Normal pulses  Heart sounds: Normal heart sounds  Pulmonary:      Effort: Pulmonary effort is normal       Breath sounds: Normal breath sounds  Lymphadenopathy:      Cervical: No cervical adenopathy  Neurological:      Mental Status: He is alert     Psychiatric:         Mood and Affect: Mood normal          Behavior: Behavior normal

## 2023-01-28 NOTE — CONSULTS
Consultation -  [facility]  Leonora Schmitt 26 y o  male MRN: [id number]  Unit/Bed#: E2 -01 Encounter: [id number]        Inpatient consult to gastroenterology  Consult performed by: Bang Verdin PA-C  Consult ordered by: Argentina Meek          Reason for Consult / Principal Problem:  Colitis    HPI:  13-year-old male with history of rheumatoid arthritis, chronic abdominal pain, and previous colitis presenting for evaluation of abdominal pain  He reports new onset epigastric pain last night  The pain was located under his rib cage  The pain felt like a dull aching sensation  The pain eventually radiated down to his right lower quadrant  The pain has been constant and sharp, currently 5/10 on the pain scale  He denies nausea and vomiting  He denies fevers and chills  He denies change in bowel habits  He has bowel movements on a near daily basis  The stool is generally formed in consistency  He denies any blood in the stool  He does have history of intermittent left lower quadrant pain particularly after eating cucumbers, watermelon, high fiber foods  He does admit to 15 lbs of unintentional weight loss over the at least the past 2 months  His appetite and oral intake has not decreased  He was diagnosed with colitis in 2016  At that time, CT scan showed left-sided colitis  He never saw a gastroenterologist or had further evaluation for the colitis  Surgical history significant for hernia repair as an infant  He states his great grandmother had Crohn's disease  He has never had EGD or colonoscopy before  REVIEW OF SYSTEMS:    CONSTITUTIONAL: Denies any fever, chills  Good appetite, and no recent weight loss  HEENT: No earache or tinnitus  Denies hearing loss or visual disturbances  CARDIOVASCULAR: No chest pain or palpitations  RESPIRATORY: Denies any cough, hemoptysis, shortness of breath or dyspnea on exertion    GASTROINTESTINAL: As noted in the History of Present Illness  GENITOURINARY: No problems with urination  Denies any hematuria or dysuria  NEUROLOGIC: No dizziness or vertigo, denies headaches  MUSCULOSKELETAL: Denies any muscle or joint pain  SKIN: Denies skin rashes or itching  ENDOCRINE: Denies excessive thirst  Denies intolerance to heat or cold  PSYCHOSOCIAL: Denies depression or anxiety  Denies any recent memory loss         Historical Information   Past Medical History:   Diagnosis Date    Allergic rhinitis     [id number] RESOLVED    Blepharitis     [id number]  UNSPECIFIED LATERALITY   [id number] RESOLVED    Rheumatoid arthritis ([address] )     Viral gastroenteritis     [id number] RESOLVED     Past Surgical History:   Procedure Laterality Date    HERNIA REPAIR Bilateral     Infant     Social History   Social History     Substance and Sexual Activity   Alcohol Use Yes    Frequency: Monthly or less    Drinks per session: 1 or 2    Binge frequency: Never     Social History     Substance and Sexual Activity   Drug Use Yes    Frequency: 2 0 times per week    Types: Marijuana     Social History     Tobacco Use   Smoking Status Never Smoker   Smokeless Tobacco Never Used     Family History   Problem Relation Age of Onset    Anxiety disorder Mother     Seizures Mother     Meniere's disease Father     Seizures Brother     Asperger's syndrome Brother        Meds/Allergies     Medications Prior to Admission   Medication    azelastine (OPTIVAR) 0 05 % ophthalmic solution     Current Facility-Administered Medications   Medication Dose Route Frequency    acetaminophen (TYLENOL) tablet 650 mg  650 mg Oral Q6H PRN    aluminum-magnesium hydroxide-simethicone (MYLANTA) 200-200-20 mg/5 mL oral suspension 30 mL  30 mL Oral Q6H PRN    ketorolac (TORADOL) injection 30 mg  30 mg Intravenous Q6H PRN    morphine injection 2 mg  2 mg Intravenous Q4H PRN    ondansetron (ZOFRAN) injection 4 mg  4 mg Intravenous Q6H PRN    sodium chloride 0 9 % infusion  100 mL/hr Intravenous Continuous       No Known Allergies        Objective     Blood pressure 100/54, pulse 86, temperature 97 5 °F (36 4 °C), temperature source Temporal, resp  rate 18, height 6' (1 829 m), weight 60 1 kg (132 lb 7 9 oz), SpO2 99 %  Intake/Output Summary (Last 24 hours) at 8/28/2020 1317  Last data filed at 8/28/2020 0600  Gross per 24 hour   Intake 1128 33 ml   Output --   Net 1128 33 ml         PHYSICAL EXAM:      General Appearance:   Alert, cooperative, no distress, appears stated age    HEENT:   Normocephalic, atraumatic, anicteric      Neck:  Supple, symmetrical, trachea midline, no adenopathy   Lungs:   Clear to auscultation bilaterally   Heart[de-identified]   S1 and S2 normal; regular rate and rhythm   Abdomen:   Nondistended  Normal bowel sounds  Soft  Mild-to-moderate right lower quadrant tenderness to palpation  No rebound or guarding      Genitalia:   Deferred    Rectal:   Deferred    Extremities:  No cyanosis, clubbing or edema    Pulses:  2+ and symmetric all extremities    Skin:  Skin color, texture, turgor normal, no rashes or lesions    Lymph nodes:  No palpable cervical lymphadenopathy        Lab Results:   Results from last 7 days   Lab Units 08/28/20  0109   WBC Thousand/uL 12 24*   HEMOGLOBIN g/dL 14 0   HEMATOCRIT % 43 4   PLATELETS Thousands/uL 162   NEUTROS PCT % 76*   LYMPHS PCT % 16   MONOS PCT % 6   EOS PCT % 2     Results from last 7 days   Lab Units 08/28/20  0109   POTASSIUM mmol/L 3 5   CHLORIDE mmol/L 102   CO2 mmol/L 29   BUN mg/dL 19   CREATININE mg/dL 1 10   CALCIUM mg/dL 9 4   ALK PHOS U/L 55   ALT U/L 27   AST U/L 20         Results from last 7 days   Lab Units 08/28/20  0109   LIPASE u/L 182       Imaging Studies: I have personally reviewed pertinent imaging studies  Ct Abdomen Pelvis With Contrast    Result Date: 8/28/2020  Impression: Portions of the small bowel demonstrate abnormal bowel wall thickening and hyperemia    A few of these loops demonstrate tapering into abnormally dilated small bowel loops which demonstrate fecalization of small bowel contents, suggesting stasis  There is also some bowel wall thickening, luminal narrowing, and hyperemia involving the midportion of the left colon  Crohn's disease should be considered  Gastroenterology consultation and follow-up is recommended  The appendix is not definitely identified  If there is concern for acute appendicitis, follow-up would be recommended  I personally discussed this study with Marta Leavitt on 8/28/2020 at 2:37 AM  Workstation performed: [id number]       ASSESSMENT and PLAN:      72-year-old male with history of rheumatoid arthritis, chronic abdominal pain, and previous colitis presenting for evaluation of abdominal pain  1) Generalized abdominal pain  2) Enteritis/colitis  3) Abnormal weight loss    He presented with acute onset epigastric pain radiating to her right lower quadrant  He does have history of intermittent left lower quadrant pain, left-sided colitis in 2016, and recent unintentional weight loss of 15 lbs  Vital signs stable  Labs significant for mild leukocytosis 12,000  Hemoglobin and CMP within normal limits  CT abdomen pelvis with IV contrast shows small bowel wall thickening, abnormally dilated small bowel loops with fecalization, and left-sided colonic wall thickening, luminal narrowing, and hyperemia  This is certainly concerning for Crohn's disease  Cannot rule out infectious enteritis/colitis     -If patient will be admitted through the weekend, would recommend EGD/colonoscopy on Monday otherwise in the next 1-2 weeks as an outpatient  -Check CRP and fecal calprotectin  -If he has diarrhea, would check stool studies including C  Diff and stool enteric panel  -Trial of Bentyl every 6 hours for abdominal pain  -Okay to start regular diet    Patient was seen and examined by Dr Mildred Villarreal  All díaz medical decisions were made by Dr Mildred Villarreal   Thank you for allowing us to participate in the care of this present patient  We will follow-up with you closely  No

## 2023-07-25 ENCOUNTER — HOSPITAL ENCOUNTER (EMERGENCY)
Facility: HOSPITAL | Age: 28
Discharge: HOME | End: 2023-07-25
Attending: EMERGENCY MEDICINE
Payer: COMMERCIAL

## 2023-07-25 VITALS
OXYGEN SATURATION: 98 % | WEIGHT: 140 LBS | BODY MASS INDEX: 18.96 KG/M2 | HEIGHT: 72 IN | SYSTOLIC BLOOD PRESSURE: 122 MMHG | RESPIRATION RATE: 18 BRPM | HEART RATE: 60 BPM | DIASTOLIC BLOOD PRESSURE: 73 MMHG | TEMPERATURE: 98.3 F

## 2023-07-25 DIAGNOSIS — S09.90XA INJURY OF HEAD, INITIAL ENCOUNTER: Primary | ICD-10-CM

## 2023-07-25 DIAGNOSIS — S06.0XAA CONCUSSION: ICD-10-CM

## 2023-07-25 PROCEDURE — 99283 EMERGENCY DEPT VISIT LOW MDM: CPT

## 2023-07-25 PROCEDURE — 99283 EMERGENCY DEPT VISIT LOW MDM: CPT | Performed by: EMERGENCY MEDICINE

## 2023-07-25 NOTE — ED PROVIDER NOTES
History  Chief Complaint   Patient presents with   • Head Injury     Patient reports to ED after standing up and hitting his head on the shelf Sunday. Patient reports headache, difficulty staying awake, and nausea. Patient states "my mother thinks I am not acting right". 60-year-old male presents for evaluation of closed head injury that occurred 2 days ago. Patient reports leaning forward and hit the top of his head on the corner of a shelf. No loss of consciousness however patient had a headache shortly later. Took pain medication with improvement of symptoms. Patient woke up today and states symptoms are also improved since initial onset. Denies changes in vision, fever, neck pain. Prior to Admission Medications   Prescriptions Last Dose Informant Patient Reported? Taking? Cholecalciferol (Vitamin D-3) 125 MCG (5000 UT) TABS   Yes No   Sig: Take 1 tablet by mouth daily   ciclopirox (PENLAC) 8 % solution   No No   Sig: Apply topically daily at bedtime   Patient not taking: Reported on 12/29/2022   dicyclomine (BENTYL) 10 mg capsule   No No   Sig: Take 1 capsule (10 mg total) by mouth every 6 (six) hours as needed (abdominal cramping)   Patient not taking: Reported on 12/29/2022      Facility-Administered Medications: None       Past Medical History:   Diagnosis Date   • Allergic rhinitis     [id number] RESOLVED   • Arthritis    • Blepharitis     [id number]  UNSPECIFIED LATERALITY   [id number] RESOLVED   • Colitis    • Rheumatoid arthritis ([address])    • Viral gastroenteritis     [id number] RESOLVED       Past Surgical History:   Procedure Laterality Date   • HERNIA REPAIR Bilateral     Infant       Family History   Problem Relation Age of Onset   • Anxiety disorder Mother    • Seizures Mother    • Meniere's disease Father    • Seizures Brother    • Asperger's syndrome Brother      I have reviewed and agree with the history as documented.     E-Cigarette/Vaping   • E-Cigarette Use Never User E-Cigarette/Vaping Substances   • Nicotine No    • THC No    • CBD No    • Flavoring No    • Other No    • Unknown No      Social History     Tobacco Use   • Smoking status: Some Days   • Smokeless tobacco: Never   • Tobacco comments:     2 times a week   Vaping Use   • Vaping Use: Never used   Substance Use Topics   • Alcohol use: Yes   • Drug use: Not Currently     Frequency: 2.0 times per week     Types: Marijuana     Comment: weekly       Review of Systems   Constitutional: Negative for fever. Neurological: Positive for headaches. Physical Exam  Physical Exam  Vitals and nursing note reviewed. Constitutional:       General: He is not in acute distress. Appearance: He is well-developed. HENT:      Head: Normocephalic and atraumatic. Right Ear: External ear normal.      Left Ear: External ear normal.      Nose: Nose normal.   Eyes:      General: No scleral icterus. Extraocular Movements: Extraocular movements intact. Pupils: Pupils are equal, round, and reactive to light. Comments: Visual fields intact   Neck:      Comments: No CTL spine tenderness. Normal range of motion  Pulmonary:      Effort: Pulmonary effort is normal. No respiratory distress. Abdominal:      General: There is no distension. Palpations: Abdomen is soft. Musculoskeletal:         General: No deformity. Normal range of motion. Cervical back: Normal range of motion and neck supple. Skin:     General: Skin is warm. Findings: No rash. Neurological:      General: No focal deficit present. Mental Status: He is alert.       Gait: Gait normal.   Psychiatric:         Mood and Affect: Mood normal.         Vital Signs  ED Triage Vitals [07/25/23 1043]   Temperature Pulse Respirations Blood Pressure SpO2   98.3 °F (36.8 °C) 60 18 122/73 98 %      Temp src Heart Rate Source Patient Position - Orthostatic VS BP Location FiO2 (%)   -- Monitor -- -- --      Pain Score       5           Vitals: 07/25/23 1043   BP: 122/73   Pulse: 60         Visual Acuity  Visual Acuity    Flowsheet Row Most Recent Value   L Pupil Size (mm) 3   R Pupil Size (mm) 3          ED Medications  Medications - No data to display    Diagnostic Studies  Results Reviewed     None                 No orders to display              Procedures  Procedures         ED Course                               SBIRT 22yo+    Flowsheet Row Most Recent Value   Initial Alcohol Screen: US AUDIT-C     1. How often do you have a drink containing alcohol? 0 Filed at: 07/25/2023 1046   2. How many drinks containing alcohol do you have on a typical day you are drinking? 0 Filed at: 07/25/2023 1046   3a. Male UNDER 65: How often do you have five or more drinks on one occasion? 0 Filed at: 07/25/2023 1046   3b. FEMALE Any Age, or MALE 65+: How often do you have 4 or more drinks on one occassion? 0 Filed at: 07/25/2023 1046   Audit-C Score 0 Filed at: 07/25/2023 1046   ARIANA: How many times in the past year have you. .. Used an illegal drug or used a prescription medication for non-medical reasons? Never Filed at: 07/25/2023 1046                    Medical Decision Making  50-year-old male presenting with minor closed head injury. Supportive care as needed. Follow-up with PCP. Concussion: acute illness or injury  Injury of head, initial encounter: acute illness or injury      Disposition  Final diagnoses:   Injury of head, initial encounter   Concussion     Time reflects when diagnosis was documented in both MDM as applicable and the Disposition within this note     Time User Action Codes Description Comment    7/25/2023 11:18 AM Lady Jane Add [S09.90XA] Injury of head, initial encounter     7/25/2023 11:18 AM Lady Jane Add [S06. 0XAA] Concussion       ED Disposition     ED Disposition   Discharge    Condition   Stable    Date/Time   Tue Jul 25, 2023 11:18 AM    Comment   Nancy Montenegro discharge to home/self care.                Follow-up Information     Follow up With Specialties Details Why Contact Info Additional [address], DO Family Medicine In 1 week  [address] [address]  [address] [facility] Emergency Department Emergency Medicine  If symptoms worsen [address] [facility] [address]  [address] [facility] Emergency Department, [address] [facility], [place], [address]          Discharge Medication List as of 7/25/2023 11:18 AM      CONTINUE these medications which have NOT CHANGED    Details   Cholecalciferol (Vitamin D-3) 125 MCG (5000 UT) TABS Take 1 tablet by mouth daily, Historical Med      ciclopirox (PENLAC) 8 % solution Apply topically daily at bedtime, Starting Fri 9/17/2021, Normal      dicyclomine (BENTYL) 10 mg capsule Take 1 capsule (10 mg total) by mouth every 6 (six) hours as needed (abdominal cramping), Starting Thu 10/1/2020, Normal             No discharge procedures on file.     PDMP Review     None          ED Provider  Electronically Signed by           Haim Mazariegos DO  07/25/23 5159

## 2023-08-01 ENCOUNTER — TELEPHONE (OUTPATIENT)
Dept: FAMILY MEDICINE CLINIC | Facility: CLINIC | Age: 28
End: 2023-08-01

## 2023-08-03 ENCOUNTER — OFFICE VISIT (OUTPATIENT)
Dept: FAMILY MEDICINE CLINIC | Facility: CLINIC | Age: 28
End: 2023-08-03
Payer: COMMERCIAL

## 2023-08-03 VITALS
HEART RATE: 86 BPM | RESPIRATION RATE: 18 BRPM | OXYGEN SATURATION: 98 % | BODY MASS INDEX: 19.91 KG/M2 | SYSTOLIC BLOOD PRESSURE: 132 MMHG | HEIGHT: 72 IN | WEIGHT: 147 LBS | DIASTOLIC BLOOD PRESSURE: 80 MMHG

## 2023-08-03 DIAGNOSIS — S06.0X0D CONCUSSION WITHOUT LOSS OF CONSCIOUSNESS, SUBSEQUENT ENCOUNTER: ICD-10-CM

## 2023-08-03 DIAGNOSIS — Z13.220 SCREENING FOR LIPID DISORDERS: ICD-10-CM

## 2023-08-03 DIAGNOSIS — G44.319 ACUTE POST-TRAUMATIC HEADACHE, NOT INTRACTABLE: ICD-10-CM

## 2023-08-03 DIAGNOSIS — S09.90XD TRAUMATIC INJURY OF HEAD, SUBSEQUENT ENCOUNTER: Primary | ICD-10-CM

## 2023-08-03 DIAGNOSIS — F17.200 SMOKER: ICD-10-CM

## 2023-08-03 DIAGNOSIS — Z00.00 HEALTH CARE MAINTENANCE: ICD-10-CM

## 2023-08-03 DIAGNOSIS — R26.89 BALANCE PROBLEMS: ICD-10-CM

## 2023-08-03 DIAGNOSIS — Z11.59 NEED FOR HEPATITIS C SCREENING TEST: ICD-10-CM

## 2023-08-03 DIAGNOSIS — E55.9 VITAMIN D DEFICIENCY: ICD-10-CM

## 2023-08-03 DIAGNOSIS — R73.9 HYPERGLYCEMIA: ICD-10-CM

## 2023-08-03 DIAGNOSIS — M08.3 POLYARTICULAR JUVENILE RHEUMATOID ARTHRITIS, CHRONIC (HCC): ICD-10-CM

## 2023-08-03 DIAGNOSIS — R53.83 OTHER FATIGUE: ICD-10-CM

## 2023-08-03 PROBLEM — Z83.3 FAMILY HISTORY OF DIABETES MELLITUS: Status: RESOLVED | Noted: 2020-03-03 | Resolved: 2023-08-03

## 2023-08-03 PROCEDURE — 99214 OFFICE O/P EST MOD 30 MIN: CPT | Performed by: FAMILY MEDICINE

## 2023-08-03 NOTE — PATIENT INSTRUCTIONS
Patient will start PT/concussion therapy  We will have patient return to work Monday, 8/14/2023 for 4 hours daily for 1 week and then on 8/21/2023 he can go back full-time  Complete blood work as ordered  [employer] 4 weeks sooner if needed

## 2023-08-03 NOTE — LETTER
August 3, 2023     Patient: Marilynn Lobo   YOB: 1995   Date of Visit: 8/3/2023       To Whom It May Concern: It is my medical opinion that Eduardo Blank may return to light duty immediately with the following restrictions: Patient may return to work 8/14/2023 4 hours daily for that week. Patient may return to full-time duty 8/21/2023 . If you have any questions or concerns, please don't hesitate to call.          Sincerely,        Juan Alfonso DO    CC: No Recipients

## 2023-08-03 NOTE — PROGRESS NOTES
Name: Pradeep Fox      : 1995      MRN: [id number]  Encounter Provider: Yuridia Short DO  Encounter Date: 8/3/2023   Encounter department: [facility] PRIMARY CARE    Assessment & Plan     1. Traumatic injury of head, subsequent encounter  -     Ambulatory Referral to Physical Therapy; Future  -     CBC  -     Comprehensive metabolic panel  -     Hemoglobin A1C; Future  -     Lipid Panel with Direct LDL reflex  -     TSH, 3rd generation with Free T4 reflex  -     UA (URINE) with reflex to Scope  -     Vitamin D 25 hydroxy; Future    2. Concussion without loss of consciousness, subsequent encounter  -     Ambulatory Referral to Physical Therapy; Future  -     CBC  -     Comprehensive metabolic panel  -     Hemoglobin A1C; Future  -     Lipid Panel with Direct LDL reflex  -     TSH, 3rd generation with Free T4 reflex  -     UA (URINE) with reflex to Scope  -     Vitamin D 25 hydroxy; Future    3. Acute post-traumatic headache, not intractable  -     Ambulatory Referral to Physical Therapy; Future  -     CBC  -     Comprehensive metabolic panel  -     Hemoglobin A1C; Future  -     Lipid Panel with Direct LDL reflex  -     TSH, 3rd generation with Free T4 reflex  -     UA (URINE) with reflex to Scope  -     Vitamin D 25 hydroxy; Future    4. Other fatigue  -     Ambulatory Referral to Physical Therapy; Future  -     CBC  -     Comprehensive metabolic panel  -     Hemoglobin A1C; Future  -     Lipid Panel with Direct LDL reflex  -     TSH, 3rd generation with Free T4 reflex  -     UA (URINE) with reflex to Scope  -     Vitamin D 25 hydroxy; Future    5. Balance problems  -     Ambulatory Referral to Physical Therapy; Future  -     CBC  -     Comprehensive metabolic panel  -     Hemoglobin A1C; Future  -     Lipid Panel with Direct LDL reflex  -     TSH, 3rd generation with Free T4 reflex  -     UA (URINE) with reflex to Scope  -     Vitamin D 25 hydroxy; Future    6.  Polyarticular juvenile rheumatoid arthritis, chronic (HCC)  Assessment & Plan:  Follow with rheumatology    Orders:  -     CBC  -     Comprehensive metabolic panel  -     Hemoglobin A1C; Future  -     Lipid Panel with Direct LDL reflex  -     TSH, 3rd generation with Free T4 reflex  -     UA (URINE) with reflex to Scope  -     Vitamin D 25 hydroxy; Future    7. Vitamin D deficiency  Assessment & Plan:  Blood work ordered    Orders:  -     CBC  -     Comprehensive metabolic panel  -     Hemoglobin A1C; Future  -     Lipid Panel with Direct LDL reflex  -     TSH, 3rd generation with Free T4 reflex  -     UA (URINE) with reflex to Scope  -     Vitamin D 25 hydroxy; Future    8. Smoker  Assessment & Plan:  Complete cessation recommended    Orders:  -     CBC  -     Comprehensive metabolic panel  -     Hemoglobin A1C; Future  -     Lipid Panel with Direct LDL reflex  -     TSH, 3rd generation with Free T4 reflex  -     UA (URINE) with reflex to Scope  -     Vitamin D 25 hydroxy; Future    9. Hyperglycemia  Assessment & Plan:  Blood work ordered    Orders:  -     CBC  -     Comprehensive metabolic panel  -     Hemoglobin A1C; Future  -     Lipid Panel with Direct LDL reflex  -     TSH, 3rd generation with Free T4 reflex  -     UA (URINE) with reflex to Scope  -     Vitamin D 25 hydroxy; Future    10. Need for hepatitis C screening test  -     Hepatitis C Antibody; Future  -     CBC  -     Comprehensive metabolic panel  -     Hemoglobin A1C; Future  -     Lipid Panel with Direct LDL reflex  -     TSH, 3rd generation with Free T4 reflex  -     UA (URINE) with reflex to Scope  -     Vitamin D 25 hydroxy; Future    11.  Health care maintenance  Assessment & Plan:  CN screening lipid discussed orders placed    Orders:  -     CBC  -     Comprehensive metabolic panel  -     Hemoglobin A1C; Future  -     Lipid Panel with Direct LDL reflex  -     TSH, 3rd generation with Free T4 reflex  -     UA (URINE) with reflex to Scope  -     Vitamin D 25 hydroxy; Future    12. Screening for lipid disorders  -     CBC  -     Comprehensive metabolic panel  -     Hemoglobin A1C; Future  -     Lipid Panel with Direct LDL reflex  -     TSH, 3rd generation with Free T4 reflex  -     UA (URINE) with reflex to Scope  -     Vitamin D 25 hydroxy; Future      Depression Screening and Follow-up Plan: Patient was screened for depression during today's encounter. They screened negative with a PHQ-2 score of 0. Subjective      On Sunday, July 23 patient was at a friend's house and stood up and hit a shelf. Patient increasing headache and was seen in the ER on the 25th at [facility]. Diagnosed with posttraumatic headache and concussion. Patient's headache has resolved but patient still feels fatigue and states occasionally is got some "balance problems. Mainly noticed by his parents not by himself. Review of Systems   Constitutional: Negative. HENT: Negative. Eyes: Negative. Respiratory: Negative. Cardiovascular: Negative. Gastrointestinal: Negative. Endocrine: Negative. Genitourinary: Negative. Musculoskeletal: Negative. Skin: Negative. Allergic/Immunologic: Negative. Neurological:        HPI   Hematological: Negative. Psychiatric/Behavioral: Negative.         Current Outpatient Medications on File Prior to Visit   Medication Sig   • [DISCONTINUED] Cholecalciferol (Vitamin D-3) 125 MCG (5000 UT) TABS Take 1 tablet by mouth daily (Patient not taking: Reported on 8/3/2023)   • [DISCONTINUED] ciclopirox (PENLAC) 8 % solution Apply topically daily at bedtime (Patient not taking: Reported on 12/29/2022)   • [DISCONTINUED] dicyclomine (BENTYL) 10 mg capsule Take 1 capsule (10 mg total) by mouth every 6 (six) hours as needed (abdominal cramping) (Patient not taking: Reported on 8/3/2023)       Objective     /80 (BP Location: Left arm, Patient Position: Sitting, Cuff Size: Large)   Pulse 86   Resp 18   Ht 6' (1.829 m)   Wt 66.7 kg (147 lb)   SpO2 98%   BMI 19.94 kg/m²     Physical Exam  Vitals and nursing note reviewed. Constitutional:       Appearance: Normal appearance. HENT:      Head: Normocephalic and atraumatic. Right Ear: Tympanic membrane normal.      Left Ear: Tympanic membrane normal.      Nose: Nose normal.      Mouth/Throat:      Mouth: Mucous membranes are moist.      Pharynx: Oropharynx is clear. No oropharyngeal exudate or posterior oropharyngeal erythema. Eyes:      General: No scleral icterus. Right eye: No discharge. Left eye: No discharge. Extraocular Movements: Extraocular movements intact. Conjunctiva/sclera: Conjunctivae normal.      Pupils: Pupils are equal, round, and reactive to light. Neck:      Vascular: No carotid bruit. Cardiovascular:      Rate and Rhythm: Normal rate and regular rhythm. Heart sounds: Normal heart sounds. Pulmonary:      Effort: Pulmonary effort is normal.      Breath sounds: Normal breath sounds. Abdominal:      General: Bowel sounds are normal.      Tenderness: There is no abdominal tenderness. Musculoskeletal:      Cervical back: Normal range of motion and neck supple. No rigidity or tenderness. Right lower leg: No edema. Left lower leg: No edema. Lymphadenopathy:      Cervical: No cervical adenopathy. Skin:     General: Skin is warm and dry. Neurological:      General: No focal deficit present. Mental Status: He is alert and oriented to person, place, and time. Cranial Nerves: No cranial nerve deficit. Sensory: No sensory deficit. Motor: No weakness. Coordination: Coordination normal.      Gait: Gait normal.      Deep Tendon Reflexes: Reflexes normal.      Comments: Romberg negative   Psychiatric:         Mood and Affect: Mood normal.         Behavior: Behavior normal.         Thought Content:  Thought content normal.         Judgment: Judgment normal.       Juan Alfonso DO

## 2023-08-03 NOTE — Clinical Note
Please check this patient he works full-time as [employer] and says he needs a Medicare AWV.   I have him scheduled however he does needed this can be changed

## 2023-08-08 ENCOUNTER — EVALUATION (OUTPATIENT)
Facility: REHABILITATION | Age: 28
End: 2023-08-08
Payer: COMMERCIAL

## 2023-08-08 DIAGNOSIS — R53.83 OTHER FATIGUE: ICD-10-CM

## 2023-08-08 DIAGNOSIS — G44.319 ACUTE POST-TRAUMATIC HEADACHE, NOT INTRACTABLE: ICD-10-CM

## 2023-08-08 DIAGNOSIS — S06.0X0D CONCUSSION WITHOUT LOSS OF CONSCIOUSNESS, SUBSEQUENT ENCOUNTER: ICD-10-CM

## 2023-08-08 DIAGNOSIS — R26.89 BALANCE PROBLEMS: ICD-10-CM

## 2023-08-08 DIAGNOSIS — S09.90XD TRAUMATIC INJURY OF HEAD, SUBSEQUENT ENCOUNTER: Primary | ICD-10-CM

## 2023-08-08 PROCEDURE — 97110 THERAPEUTIC EXERCISES: CPT | Performed by: PHYSICAL THERAPIST

## 2023-08-08 PROCEDURE — 97162 PT EVAL MOD COMPLEX 30 MIN: CPT | Performed by: PHYSICAL THERAPIST

## 2023-08-08 PROCEDURE — 97535 SELF CARE MNGMENT TRAINING: CPT | Performed by: PHYSICAL THERAPIST

## 2023-08-08 NOTE — PROGRESS NOTES
PT Evaluation     Today's date: 2023  Patient name: Naif Small  : 1995   MRN: [id number]  Referring provider: Juana Galaviz DO  Dx:   Encounter Diagnosis     ICD-10-CM    1. Traumatic injury of head, subsequent encounter  S09. 90XD Ambulatory Referral to Physical Therapy      2. Concussion without loss of consciousness, subsequent encounter  S06.0X0D Ambulatory Referral to Physical Therapy      3. Acute post-traumatic headache, not intractable  G44.319 Ambulatory Referral to Physical Therapy      4. Other fatigue  R53.83 Ambulatory Referral to Physical Therapy      5. Balance problems  R26.89 Ambulatory Referral to Physical Therapy                     Assessment  Assessment details: Charlee Castellano presents to PT with chief complaints of cognitive fogginess and difficulty concentrating following a concussion occurring on 23. Results of evaluation indicate good ocular motor function, good vestibular ocular reflex, and good static balance. On [place] concussion treadmill test was limited by weakness in bilateral legs however was not impaired by any concussion related symptoms. He does demonstrate hypermobility of the cervical spine which likely contributes to his complaints of chronic neck pain. No indications of cervical ligament incompetency today. Initiated some exercises today for cervical stabilization. He was provided with thorough education on concussion pathology, and appropriate steps for healthy recovery. Recommended gradual return to normal activities as tolerated allowing for minimal increase in symptoms. We will follow-up again next week to assess for full resolution of symptoms if patient continues to present with cognitive symptoms at that time we will make referral to Occupational Therapy. These deficits are resulting in difficulty with returning to work related duties at [employer] He would benefit from skilled PT to address these deficits and maximize his function. Barriers to therapy: None  Understanding of Dx/Px/POC: good   Prognosis: good    Goals  STGs (4 weeks)  Pt will be independent with comprehensive HEP   Pt will demonstrate 0 points on adult symptom severity scale    LTG's (to be achieved by d/c)  Pt will be able to self manage sx's independently   Pt will report no difficulty with lifting 50 pounds and working for 6 to 8-hour shifts in order to return to work-related duties    Plan  Plan details: At next visit reassess for resolution of symptoms. Perform work-related activities to ensure good tolerance. May discharge if improved. Referral to OT if cognitive complaints continue at that time  Patient would benefit from: skilled physical therapy  Planned therapy interventions: therapeutic exercise, therapeutic activities, patient education, postural training and neuromuscular re-education  Frequency: 2x week  Duration in weeks: 4  Plan of Care beginning date: 8/8/2023  Plan of Care expiration date: 9/12/2023  Treatment plan discussed with: patient        Subjective Evaluation    History of Present Illness  Mechanism of injury: Birdie Laureano comes to PT with s/p concussion occurring 7/23/23 (just over 2 weeks ago). Hit his head on a metal shelf. At the time though he was fine, but when waking up the next day had headache, confusion, fatigue, and fogginess. Went to the ED two days later and was dx with a concussion. Followed up with his PCP who recommended PT and blood work. He reports improvement in his fogginess and fatigue, and headaches. Is having some neck pain, but this is not new he often gets neck pain with sleeping in the wrong position. Still has confusion and difficulty concentrating. Reports being more porras and frustrated. Mild dizziness. Vision is good, no blurriness or irritation with computer screens or reading. His family has noticed him wandering aimless around the house. No issues with some light exercise. Works at [employer]  He is off of work until the . Starting off part time for his return. 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 Tasks in his daily life are tolerated well though. Pt goals: make sure I'm   Pain  Current pain ratin  At worst pain ratin  Location: neck           Objective     Adult Concussion Symptom Severity Score: 30/132    CARDIOVASCULAR SCREENING  Resting BP: 124/74  Resting HR: 60    [place] Concussion Treadmill Test:   BCTT Protocol                 **if max incline reach without symptoms, increase speed . 4mph each minute. Test stopped if >3 Sx increase, 10/10 RPE, or 90% Hrmax    3. 2mph-3.6 depending height (5' 10" cutoff)             Time Speed Incline HR RPE Sx     0 min  3.2 0%        1 min 3.2 1%        2 min 3.2 2% 113bpm 1 0     3 min 3.2 3%        4 min 3.2 4% 120bpm 3 0     5 min 3.2 5%        6 min 3.2 6% 120bpm 6      7 min 3.2 7%        8 min 3.2 8% 139bpm 10 (legs tired) 0     9 min 3.2 9%        10 min 3.2 10%        11 min 3.2 11%        12 min 3.2 12%        13 min 3.2 13%        14 min 3.2 14%        15 min 3.2 15%        16 min 3.6 15%              CERVICAL     Ligament Laxity Testing:     Alar Ligament: neg  Transverse Ligament: neg      Cervical Palpation: unremarkable     Cervical Posture: forward flexed, rounded shoulders     Cervical Range of Motion     Flexion 50    Extension 88     Left Right   Lateral Flexion 70 70   Rotation 90 90           VESTIBULAR OCULOMOTOR SCREENING  Oculomotor ROM : WNL  Resting nystagmus: No  Gaze holding nystagmus No   Smooth pursuit Normal    Vertical Saccades:Normal  Horizontal Saccades:Normal  Convergence: Normal    Cover/Uncover/Crosscover Test: Normal    Head thrust (room light): Normal    VOR cancellation: Normal    VORx1 test: Normal           BALANCE MOTOR SCREENING    MCTSIB Eval     Eyes open firm surface 30s    Eyes closed firm surface 30s    Eyes open foam surface 30s    Eyes closed foam surface 30s             Precautions: Rheumatoid Arthritis HEP: chin tucks, cervical Isometrics     Manuals 8/8                                                                Neuro Re-Ed                                                                                                        Ther Ex             Chin tucks  x10 5"            Cervical Isometrics  Flex/ext/ rot/SB 5" hold ea                                                                                          Ther Activity             Lifting, carrying (up to 50lbs)                          Gait Training                                       Modalities

## 2023-08-09 ENCOUNTER — TELEPHONE (OUTPATIENT)
Dept: FAMILY MEDICINE CLINIC | Facility: CLINIC | Age: 28
End: 2023-08-09

## 2023-08-10 ENCOUNTER — SEXUAL HEALTH (OUTPATIENT)
Dept: SURGERY | Facility: CLINIC | Age: 28
End: 2023-08-10

## 2023-08-10 DIAGNOSIS — Z11.3 SCREENING FOR STD (SEXUALLY TRANSMITTED DISEASE): Primary | ICD-10-CM

## 2023-08-10 NOTE — PROGRESS NOTES
Assessment/Plan:   Urine collected for GC/CT testing. Blood collected for HIV/syphilis testing. Recommend safer sex practices including 100% condom use. Recommend regular STD testing. Follow up 1 week for results. Practicing safe sex using a condom for each sexually encounter. Condoms offer the best protection against STD's by acting as a physical barrier to prevent the exchange of semen, vaginal fluids, and blood between partners. Condoms are not a 100% effective in protection. Reducing the number of sexual partners can also help to reduce the risk of the transmission of STD's along with regular STD screening. Problem List Items Addressed This Visit    None  Visit Diagnoses     Screening for STD (sexually transmitted disease)    -  Primary            Subjective:      Patient ID: Bernarda Bonilla is a 33 y.o. male. Patient seen in STD clinic for STD screening. Patient is not currently sexually active, but plans to start a relationship. Denies urethral discharge, burning or pain with urination, blood in urine, testicular pain or swelling, fever or chills. The following portions of the patient's history were reviewed and updated as appropriate: allergies, current medications, past family history, past medical history, past social history, past surgical history and problem list.    Review of Systems   Constitutional: Negative for chills, diaphoresis, fatigue and fever. Gastrointestinal: Negative for abdominal pain. Genitourinary: Negative for decreased urine volume, difficulty urinating, dysuria, frequency, genital sores, hematuria, penile discharge, penile pain, penile swelling, scrotal swelling, testicular pain and urgency. Skin: Negative for rash and wound. Hematological: Negative for adenopathy. Objective: There were no vitals taken for this visit. Physical Exam  Nursing note reviewed. Constitutional:       General: He is not in acute distress. Appearance: Normal appearance. He is not ill-appearing, toxic-appearing or diaphoretic. HENT:      Head: Normocephalic and atraumatic. Eyes:      General: No scleral icterus. Neurological:      Mental Status: He is alert and oriented to person, place, and time. Gait: Gait normal.   Psychiatric:         Mood and Affect: Mood normal.         Behavior: Behavior normal.         Thought Content: Thought content normal.         Judgment: Judgment normal.                CHIEF CONCERN(S)    No LMP for male patient. Condom Used: Occasionally    Sexual Preference :  Female    Date of Last Sexual Exposure: 4/2023    # of Partners: Last 30 days 0, Last 90 days 1 and Last Year 1    Sexual Practices: Oral and Vaginal      Previously Sexually Transmitted Diseases  Type Date Source of Care Treatment Comment   Denies                         Test Date Results   RPR 2021 Negative   HIC 2021 Negative   GC 2021 Negative   CT 2021 Negative         1. CURRENT RISK BEHAVIOR(S)    Other     PREVIOUS SUCCESSES    Used condoms when having sex, Maintained a monogamous relationship with only one partner, Practiced abstinence and Discussed condom use prior to having sex with partner(s)        3. SAFER GOAL BEHAVIOR(S)    Always use condoms to have sex, Practice abstinence, Pre-exposure prophylaxis (PrEP), Practice monogamy and Get tested if condom breaks/ leaks          4. PERSON ACTION PLAN:    > BARRIERS:    No condom use or discussions and Get involved in a monogamist relationship    > BENEFITS:    Obtain free condoms from [facility] to decrease STD exposure and Provides a healthier sexual relationship and can reduce STD's        > ACTION STEPS:      Use condoms at least 50% of the time and steadily increase over time until condom use is 100% of the time, Choose to abstain from sex, Discuss condom use prior to having sex with partner(s) and Get tested is an exposure occurred such as a condom breaks/ leaked          4. REFERRALS:  Consent given for HIV testing.

## 2023-08-17 ENCOUNTER — SEXUAL HEALTH (OUTPATIENT)
Dept: SURGERY | Facility: CLINIC | Age: 28
End: 2023-08-17

## 2023-08-17 ENCOUNTER — OFFICE VISIT (OUTPATIENT)
Facility: REHABILITATION | Age: 28
End: 2023-08-17
Payer: COMMERCIAL

## 2023-08-17 ENCOUNTER — TELEPHONE (OUTPATIENT)
Dept: FAMILY MEDICINE CLINIC | Facility: CLINIC | Age: 28
End: 2023-08-17

## 2023-08-17 DIAGNOSIS — R53.83 OTHER FATIGUE: ICD-10-CM

## 2023-08-17 DIAGNOSIS — R41.3 MEMORY CHANGE: ICD-10-CM

## 2023-08-17 DIAGNOSIS — Z71.2 ENCOUNTER TO DISCUSS TEST RESULTS: Primary | ICD-10-CM

## 2023-08-17 DIAGNOSIS — G44.319 ACUTE POST-TRAUMATIC HEADACHE, NOT INTRACTABLE: ICD-10-CM

## 2023-08-17 DIAGNOSIS — S06.0X0D CONCUSSION WITHOUT LOSS OF CONSCIOUSNESS, SUBSEQUENT ENCOUNTER: Primary | ICD-10-CM

## 2023-08-17 DIAGNOSIS — R41.840 IMPAIRED CONCENTRATION: ICD-10-CM

## 2023-08-17 DIAGNOSIS — S09.90XD TRAUMATIC INJURY OF HEAD, SUBSEQUENT ENCOUNTER: Primary | ICD-10-CM

## 2023-08-17 DIAGNOSIS — R26.89 BALANCE PROBLEMS: ICD-10-CM

## 2023-08-17 DIAGNOSIS — S06.0X0D CONCUSSION WITHOUT LOSS OF CONSCIOUSNESS, SUBSEQUENT ENCOUNTER: ICD-10-CM

## 2023-08-17 PROCEDURE — 97112 NEUROMUSCULAR REEDUCATION: CPT | Performed by: PHYSICAL THERAPIST

## 2023-08-17 NOTE — TELEPHONE ENCOUNTER
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 He does however still have some memory and concentration impairments which have been slower to recover. Would you be willing to write a script for occupational therapy eval to work on memory and concentration? Please reach out with any questions.     Thank you,  Ewelina Santiago, PT, DPT

## 2023-08-17 NOTE — PROGRESS NOTES
Pt here for STD/HIV results. GC, CT, RPR, HIV all negative, reviewed and given to pt. Pt re-educated on safe sex practices. Pt stated understanding.

## 2023-08-17 NOTE — PROGRESS NOTES
Daily Note / Discharge    Today's date: 2023  Patient name: Faviola Beckford  : 1995  MRN: [id number]  Referring provider: Carol Adan DO  Dx:   Encounter Diagnosis     ICD-10-CM    1. Traumatic injury of head, subsequent encounter  S09. 90XD       2. Concussion without loss of consciousness, subsequent encounter  S06.0X0D       3. Acute post-traumatic headache, not intractable  G44.319       4. Other fatigue  R53.83       5. Balance problems  R26.89                      Subjective: Feeling improved. Back to work at 50% usual time and on lighter duty which is going very well. Headaches are no longer a problem and no neck pain. Taking walks outside which are going well. Cognitive fogginess and concentration are slower to recover. Ji Estes noticed he was a little "off" and did forget something he was supposed to do at work. Objective: See treatment diary below    SLUMS:  (mild neurocognitive disorder)    Adult Concussion Symptom Severity Score: On initial Evaluation: 30  Today : 5    Assessment: Since initial evaluation, Tyrone Onofre has demonstrated significant improvement with education on concussion and time for spontaneous recovery. At this time demonstrating no physical impairments or functional impairments that need to be address in PT. Supported by Adult Concussion Symptom Severity Score which was performed again today. He does however still report some cognition impairments related mostly to memory and concentration. SLUMS examination was administered which did support his subjective complaints, scoring below average for someone his age and education level. An occupational therapy evaluation for retraining of memory and concentration would be appropriate at this time, will request from PCP. No further need for skilled PT and will discharge at this time. Plan: Discharge. Referral to occupational therapy for memory and concentration.       Precautions: Rheumatoid Arthritis     HEP: chin tucks, cervical Isometrics     Manuals 8/8                                                                Neuro Re-Ed                                                                                                        Ther Ex             Chin tucks  x10 5"            Cervical Isometrics  Flex/ext/ rot/SB 5" hold ea                                                                                          Ther Activity                                       Gait Training                                       Modalities

## 2023-08-31 ENCOUNTER — OFFICE VISIT (OUTPATIENT)
Dept: FAMILY MEDICINE CLINIC | Facility: CLINIC | Age: 28
End: 2023-08-31
Payer: COMMERCIAL

## 2023-08-31 VITALS
SYSTOLIC BLOOD PRESSURE: 118 MMHG | HEART RATE: 63 BPM | RESPIRATION RATE: 20 BRPM | WEIGHT: 142 LBS | DIASTOLIC BLOOD PRESSURE: 72 MMHG | BODY MASS INDEX: 19.23 KG/M2 | HEIGHT: 72 IN | OXYGEN SATURATION: 96 %

## 2023-08-31 DIAGNOSIS — S09.90XD TRAUMATIC INJURY OF HEAD, SUBSEQUENT ENCOUNTER: ICD-10-CM

## 2023-08-31 DIAGNOSIS — E55.9 VITAMIN D DEFICIENCY: ICD-10-CM

## 2023-08-31 DIAGNOSIS — R41.840 IMPAIRED CONCENTRATION: ICD-10-CM

## 2023-08-31 DIAGNOSIS — R41.3 MEMORY CHANGE: ICD-10-CM

## 2023-08-31 DIAGNOSIS — M08.3 POLYARTICULAR JUVENILE RHEUMATOID ARTHRITIS, CHRONIC (HCC): ICD-10-CM

## 2023-08-31 DIAGNOSIS — R73.9 HYPERGLYCEMIA: ICD-10-CM

## 2023-08-31 DIAGNOSIS — F17.200 SMOKER: ICD-10-CM

## 2023-08-31 DIAGNOSIS — S06.0X0D CONCUSSION WITHOUT LOSS OF CONSCIOUSNESS, SUBSEQUENT ENCOUNTER: Primary | ICD-10-CM

## 2023-08-31 DIAGNOSIS — R26.89 BALANCE PROBLEMS: ICD-10-CM

## 2023-08-31 PROBLEM — S06.0X0A CONCUSSION WITH NO LOSS OF CONSCIOUSNESS: Status: ACTIVE | Noted: 2023-08-31

## 2023-08-31 PROBLEM — S09.90XA HEAD TRAUMA: Status: ACTIVE | Noted: 2023-08-31

## 2023-08-31 PROCEDURE — 99214 OFFICE O/P EST MOD 30 MIN: CPT | Performed by: FAMILY MEDICINE

## 2023-08-31 NOTE — PATIENT INSTRUCTIONS
Complete blood work as ordered  There is an order for occupational therapy if you choose to complete the orders there  Return in 3 months for office visit sooner if needed

## 2023-08-31 NOTE — PROGRESS NOTES
Name: Yumiko Goode      : 1995      MRN: [id number]  Encounter Provider: Radha Martin DO  Encounter Date: 2023   Encounter department: [facility]    Assessment & Plan     1. Concussion #2. Memory change #3. Impaired concentration  4. Head trauma  5. Balance problems  Patient is back working without difficulty. Patient is done with physical therapy patient's headache and balance problems are completely resolved patient feels his memory and concentration are "98%" back to normal patient does have Occupational Therapy consultation ordered patient not sure if he will complete secondary to much improved symptomatology  6. Hyperglycemia patient will complete blood work  7. Vitamin D deficiency, patient will complete blood work  8. Smoker, complete cessation recommended  9. Polyarticular JRA, stable follows with rheumatology  10. Return in 3 months sooner if needed      1. Concussion without loss of consciousness, subsequent encounter  Assessment & Plan:  Feels "98% "better. No balance or headache issues. Memory and concentration "98%" better. Is considering OT consultation      2. Memory change  Assessment & Plan:  As above      3. Impaired concentration  Assessment & Plan:  As above      4. Traumatic injury of head, subsequent encounter  Assessment & Plan:  Headaches have resolved      5. Balance problems  Assessment & Plan:  Asymptomatic at the present time      6. Hyperglycemia  Assessment & Plan:  Complete blood work      7. Vitamin D deficiency  Assessment & Plan: To complete blood work      8. Smoker  Assessment & Plan:  Complete cessation recommended      9. Polyarticular juvenile rheumatoid arthritis, chronic (HCC)  Assessment & Plan: To follow with rheumatology             Subjective      Patient doing much better since last office visit physical therapy really helped the patient. Patient has no longer has headache or balance issues.   Patient's memory and concentration is "98%" better since last physical therapy evaluation. There is an order for occupational therapy consultation patient will consider if he fails to improve patient will complete blood work in the near future    Review of Systems   Constitutional: Negative. HENT: Negative. Eyes: Negative. Respiratory: Negative. Cardiovascular: Negative. Gastrointestinal: Negative. Endocrine: Negative. Genitourinary: Negative. Musculoskeletal: Negative. Skin: Negative. Allergic/Immunologic: Negative. Neurological:        HPI   Hematological: Negative. Psychiatric/Behavioral: Negative. No current outpatient medications on file prior to visit. Objective     /72 (BP Location: Right arm, Patient Position: Sitting, Cuff Size: Large)   Pulse 63   Resp 20   Ht 6' (1.829 m)   Wt 64.4 kg (142 lb)   SpO2 96%   BMI 19.26 kg/m²     Physical Exam  Vitals and nursing note reviewed. Constitutional:       Appearance: Normal appearance. HENT:      Head: Normocephalic and atraumatic. Right Ear: Tympanic membrane normal.      Left Ear: Tympanic membrane normal.      Nose: Nose normal.      Mouth/Throat:      Mouth: Mucous membranes are moist.      Pharynx: Oropharynx is clear. No oropharyngeal exudate or posterior oropharyngeal erythema. Eyes:      General: No scleral icterus. Right eye: No discharge. Left eye: No discharge. Extraocular Movements: Extraocular movements intact. Conjunctiva/sclera: Conjunctivae normal.      Pupils: Pupils are equal, round, and reactive to light. Cardiovascular:      Rate and Rhythm: Normal rate and regular rhythm. Heart sounds: Normal heart sounds. Pulmonary:      Effort: Pulmonary effort is normal.      Breath sounds: Normal breath sounds. Abdominal:      General: Bowel sounds are normal.      Palpations: Abdomen is soft. Tenderness: There is no abdominal tenderness.    Musculoskeletal: Cervical back: Neck supple. No rigidity or tenderness. Right lower leg: No edema. Left lower leg: No edema. Skin:     General: Skin is warm and dry. Neurological:      General: No focal deficit present. Mental Status: He is alert and oriented to person, place, and time. Cranial Nerves: No cranial nerve deficit. Coordination: Coordination normal.      Gait: Gait normal.      Comments: Romberg negative   Psychiatric:         Mood and Affect: Mood normal.         Behavior: Behavior normal.         Thought Content:  Thought content normal.         Judgment: Judgment normal.       Juan Alfonso, DO

## 2023-08-31 NOTE — ASSESSMENT & PLAN NOTE
Feels "98% "better. No balance or headache issues. Memory and concentration "98%" better.   Is considering OT consultation

## 2023-11-21 ENCOUNTER — APPOINTMENT (OUTPATIENT)
Dept: URGENT CARE | Facility: MEDICAL CENTER | Age: 28
End: 2023-11-21
Payer: OTHER MISCELLANEOUS

## 2023-11-21 PROCEDURE — 99283 EMERGENCY DEPT VISIT LOW MDM: CPT

## 2023-11-21 PROCEDURE — G0382 LEV 3 HOSP TYPE B ED VISIT: HCPCS

## 2023-11-27 ENCOUNTER — OCCMED (OUTPATIENT)
Dept: URGENT CARE | Facility: MEDICAL CENTER | Age: 28
End: 2023-11-27
Payer: OTHER MISCELLANEOUS

## 2023-11-27 PROCEDURE — 99213 OFFICE O/P EST LOW 20 MIN: CPT | Performed by: FAMILY MEDICINE
